# Patient Record
Sex: MALE | Race: ASIAN | NOT HISPANIC OR LATINO
[De-identification: names, ages, dates, MRNs, and addresses within clinical notes are randomized per-mention and may not be internally consistent; named-entity substitution may affect disease eponyms.]

---

## 2018-01-09 ENCOUNTER — APPOINTMENT (OUTPATIENT)
Dept: UROLOGY | Facility: CLINIC | Age: 68
End: 2018-01-09
Payer: MEDICARE

## 2018-01-09 PROCEDURE — 99214 OFFICE O/P EST MOD 30 MIN: CPT

## 2018-01-10 LAB
APPEARANCE: CLEAR
BACTERIA: NEGATIVE
BILIRUBIN URINE: NEGATIVE
BLOOD URINE: NEGATIVE
COLOR: YELLOW
GLUCOSE QUALITATIVE U: 100 MG/DL
HYALINE CASTS: 3 /LPF
KETONES URINE: ABNORMAL
LEUKOCYTE ESTERASE URINE: NEGATIVE
MICROSCOPIC-UA: NORMAL
NITRITE URINE: NEGATIVE
PH URINE: 5
PROTEIN URINE: NEGATIVE MG/DL
RED BLOOD CELLS URINE: 2 /HPF
SPECIFIC GRAVITY URINE: 1.03
SQUAMOUS EPITHELIAL CELLS: 0 /HPF
UROBILINOGEN URINE: NEGATIVE MG/DL
WHITE BLOOD CELLS URINE: 1 /HPF

## 2018-01-11 LAB — BACTERIA UR CULT: NORMAL

## 2018-02-14 ENCOUNTER — APPOINTMENT (OUTPATIENT)
Dept: GASTROENTEROLOGY | Facility: CLINIC | Age: 68
End: 2018-02-14
Payer: MEDICARE

## 2018-02-14 VITALS
RESPIRATION RATE: 16 BRPM | TEMPERATURE: 97.8 F | HEIGHT: 65 IN | BODY MASS INDEX: 24.66 KG/M2 | HEART RATE: 81 BPM | DIASTOLIC BLOOD PRESSURE: 72 MMHG | SYSTOLIC BLOOD PRESSURE: 124 MMHG | OXYGEN SATURATION: 98 % | WEIGHT: 148 LBS

## 2018-02-14 DIAGNOSIS — R10.30 LOWER ABDOMINAL PAIN, UNSPECIFIED: ICD-10-CM

## 2018-02-14 PROCEDURE — 99203 OFFICE O/P NEW LOW 30 MIN: CPT

## 2018-03-19 ENCOUNTER — RESULT REVIEW (OUTPATIENT)
Age: 68
End: 2018-03-19

## 2018-03-19 ENCOUNTER — OUTPATIENT (OUTPATIENT)
Dept: OUTPATIENT SERVICES | Facility: HOSPITAL | Age: 68
LOS: 1 days | Discharge: HOME | End: 2018-03-19
Payer: MEDICARE

## 2018-03-19 ENCOUNTER — APPOINTMENT (OUTPATIENT)
Dept: GASTROENTEROLOGY | Facility: HOSPITAL | Age: 68
End: 2018-03-19

## 2018-03-19 DIAGNOSIS — Z12.11 ENCOUNTER FOR SCREENING FOR MALIGNANT NEOPLASM OF COLON: ICD-10-CM

## 2018-03-19 LAB — GLUCOSE BLDC GLUCOMTR-MCNC: 108 MG/DL — HIGH (ref 70–99)

## 2018-03-19 PROCEDURE — 45380 COLONOSCOPY AND BIOPSY: CPT | Mod: GC

## 2018-03-19 PROCEDURE — 88305 TISSUE EXAM BY PATHOLOGIST: CPT | Mod: 26

## 2018-03-23 LAB — SURGICAL PATHOLOGY STUDY: SIGNIFICANT CHANGE UP

## 2018-04-20 ENCOUNTER — FORM ENCOUNTER (OUTPATIENT)
Age: 68
End: 2018-04-20

## 2018-04-21 ENCOUNTER — APPOINTMENT (OUTPATIENT)
Dept: MRI IMAGING | Facility: IMAGING CENTER | Age: 68
End: 2018-04-21
Payer: MEDICARE

## 2018-04-21 ENCOUNTER — OUTPATIENT (OUTPATIENT)
Dept: OUTPATIENT SERVICES | Facility: HOSPITAL | Age: 68
LOS: 1 days | End: 2018-04-21
Payer: COMMERCIAL

## 2018-04-21 DIAGNOSIS — Z00.8 ENCOUNTER FOR OTHER GENERAL EXAMINATION: ICD-10-CM

## 2018-04-21 PROCEDURE — 70551 MRI BRAIN STEM W/O DYE: CPT | Mod: 26

## 2018-04-21 PROCEDURE — 70551 MRI BRAIN STEM W/O DYE: CPT

## 2018-05-10 ENCOUNTER — APPOINTMENT (OUTPATIENT)
Dept: NEUROSURGERY | Facility: CLINIC | Age: 68
End: 2018-05-10
Payer: MEDICARE

## 2018-05-10 VITALS
BODY MASS INDEX: 24.49 KG/M2 | SYSTOLIC BLOOD PRESSURE: 142 MMHG | DIASTOLIC BLOOD PRESSURE: 83 MMHG | OXYGEN SATURATION: 98 % | WEIGHT: 147 LBS | HEART RATE: 65 BPM | HEIGHT: 65 IN

## 2018-05-10 DIAGNOSIS — R10.9 UNSPECIFIED ABDOMINAL PAIN: ICD-10-CM

## 2018-05-10 PROCEDURE — 99205 OFFICE O/P NEW HI 60 MIN: CPT | Mod: 57

## 2018-05-11 ENCOUNTER — APPOINTMENT (OUTPATIENT)
Dept: OPHTHALMOLOGY | Facility: CLINIC | Age: 68
End: 2018-05-11
Payer: MEDICARE

## 2018-05-11 PROCEDURE — 92083 EXTENDED VISUAL FIELD XM: CPT | Mod: TC

## 2018-05-31 ENCOUNTER — APPOINTMENT (OUTPATIENT)
Dept: NEUROLOGY | Facility: CLINIC | Age: 68
End: 2018-05-31
Payer: MEDICARE

## 2018-05-31 DIAGNOSIS — F41.9 ANXIETY DISORDER, UNSPECIFIED: ICD-10-CM

## 2018-05-31 DIAGNOSIS — F32.A ANXIETY DISORDER, UNSPECIFIED: ICD-10-CM

## 2018-05-31 PROCEDURE — 96118: CPT

## 2018-05-31 PROCEDURE — 96119: CPT | Mod: 59

## 2018-06-22 ENCOUNTER — APPOINTMENT (OUTPATIENT)
Dept: RADIOLOGY | Facility: HOSPITAL | Age: 68
End: 2018-06-22

## 2018-06-22 ENCOUNTER — OUTPATIENT (OUTPATIENT)
Dept: OUTPATIENT SERVICES | Facility: HOSPITAL | Age: 68
LOS: 1 days | End: 2018-06-22
Payer: COMMERCIAL

## 2018-06-22 DIAGNOSIS — G91.9 HYDROCEPHALUS, UNSPECIFIED: ICD-10-CM

## 2018-06-22 PROCEDURE — 62270 DX LMBR SPI PNXR: CPT

## 2018-06-22 PROCEDURE — 77003 FLUOROGUIDE FOR SPINE INJECT: CPT

## 2018-06-22 PROCEDURE — G8979: CPT | Mod: CH

## 2018-06-22 PROCEDURE — G8980: CPT | Mod: CH

## 2018-06-22 PROCEDURE — G8978: CPT | Mod: CH

## 2018-06-22 PROCEDURE — 77003 FLUOROGUIDE FOR SPINE INJECT: CPT | Mod: 26

## 2018-06-25 ENCOUNTER — APPOINTMENT (OUTPATIENT)
Dept: NEUROLOGY | Facility: CLINIC | Age: 68
End: 2018-06-25
Payer: MEDICARE

## 2018-06-25 PROCEDURE — 96118: CPT

## 2018-06-25 PROCEDURE — 96119: CPT | Mod: 59

## 2018-06-28 LAB
APPEARANCE CSF: CLEAR
COLOR CSF: NORMAL
COLOR SPUN CSF: COLORLESS
GLUCOSE CSF-MCNC: 87 MG/DL
LYMPHOCYTES NFR CSF MANUAL: 37 %
MONOS+MACROS NFR CSF MANUAL: 63 %
NEUTROPHILS NFR CSF MANUAL: 0 %
PROT CSF-MCNC: 81 MG/DL
RBC # CSF MANUAL: 21 /UL
TOTAL CELLS COUNTED CSF: 2 /UL
TUBE TYPE: NORMAL

## 2018-07-10 ENCOUNTER — APPOINTMENT (OUTPATIENT)
Dept: NEUROSURGERY | Facility: CLINIC | Age: 68
End: 2018-07-10
Payer: MEDICARE

## 2018-07-10 VITALS
SYSTOLIC BLOOD PRESSURE: 120 MMHG | DIASTOLIC BLOOD PRESSURE: 77 MMHG | WEIGHT: 147 LBS | HEIGHT: 65 IN | BODY MASS INDEX: 24.49 KG/M2 | HEART RATE: 70 BPM | OXYGEN SATURATION: 98 % | TEMPERATURE: 98.5 F

## 2018-07-10 PROCEDURE — 99215 OFFICE O/P EST HI 40 MIN: CPT | Mod: 57

## 2018-07-11 ENCOUNTER — OUTPATIENT (OUTPATIENT)
Dept: OUTPATIENT SERVICES | Facility: HOSPITAL | Age: 68
LOS: 1 days | End: 2018-07-11
Payer: COMMERCIAL

## 2018-07-11 VITALS
HEART RATE: 67 BPM | HEIGHT: 65 IN | SYSTOLIC BLOOD PRESSURE: 108 MMHG | OXYGEN SATURATION: 98 % | DIASTOLIC BLOOD PRESSURE: 73 MMHG | WEIGHT: 147.05 LBS | TEMPERATURE: 98 F | RESPIRATION RATE: 20 BRPM

## 2018-07-11 DIAGNOSIS — Z29.9 ENCOUNTER FOR PROPHYLACTIC MEASURES, UNSPECIFIED: ICD-10-CM

## 2018-07-11 DIAGNOSIS — Z98.890 OTHER SPECIFIED POSTPROCEDURAL STATES: Chronic | ICD-10-CM

## 2018-07-11 DIAGNOSIS — G91.9 HYDROCEPHALUS, UNSPECIFIED: ICD-10-CM

## 2018-07-11 DIAGNOSIS — G47.33 OBSTRUCTIVE SLEEP APNEA (ADULT) (PEDIATRIC): ICD-10-CM

## 2018-07-11 DIAGNOSIS — Z91.040 LATEX ALLERGY STATUS: ICD-10-CM

## 2018-07-11 DIAGNOSIS — E11.9 TYPE 2 DIABETES MELLITUS WITHOUT COMPLICATIONS: ICD-10-CM

## 2018-07-11 DIAGNOSIS — I10 ESSENTIAL (PRIMARY) HYPERTENSION: ICD-10-CM

## 2018-07-11 DIAGNOSIS — N40.0 BENIGN PROSTATIC HYPERPLASIA WITHOUT LOWER URINARY TRACT SYMPTOMS: ICD-10-CM

## 2018-07-11 DIAGNOSIS — Z01.818 ENCOUNTER FOR OTHER PREPROCEDURAL EXAMINATION: ICD-10-CM

## 2018-07-11 DIAGNOSIS — I83.90 ASYMPTOMATIC VARICOSE VEINS OF UNSPECIFIED LOWER EXTREMITY: Chronic | ICD-10-CM

## 2018-07-11 LAB
ANION GAP SERPL CALC-SCNC: 12 MMOL/L — SIGNIFICANT CHANGE UP (ref 5–17)
BUN SERPL-MCNC: 19 MG/DL — SIGNIFICANT CHANGE UP (ref 7–23)
CALCIUM SERPL-MCNC: 9.6 MG/DL — SIGNIFICANT CHANGE UP (ref 8.4–10.5)
CHLORIDE SERPL-SCNC: 101 MMOL/L — SIGNIFICANT CHANGE UP (ref 96–108)
CO2 SERPL-SCNC: 26 MMOL/L — SIGNIFICANT CHANGE UP (ref 22–31)
CREAT SERPL-MCNC: 1 MG/DL — SIGNIFICANT CHANGE UP (ref 0.5–1.3)
GLUCOSE SERPL-MCNC: 112 MG/DL — HIGH (ref 70–99)
HCT VFR BLD CALC: 40.6 % — SIGNIFICANT CHANGE UP (ref 39–50)
HGB BLD-MCNC: 13.8 G/DL — SIGNIFICANT CHANGE UP (ref 13–17)
MCHC RBC-ENTMCNC: 28.5 PG — SIGNIFICANT CHANGE UP (ref 27–34)
MCHC RBC-ENTMCNC: 34 GM/DL — SIGNIFICANT CHANGE UP (ref 32–36)
MCV RBC AUTO: 83.9 FL — SIGNIFICANT CHANGE UP (ref 80–100)
PLATELET # BLD AUTO: 216 K/UL — SIGNIFICANT CHANGE UP (ref 150–400)
POTASSIUM SERPL-MCNC: 4.7 MMOL/L — SIGNIFICANT CHANGE UP (ref 3.5–5.3)
POTASSIUM SERPL-SCNC: 4.7 MMOL/L — SIGNIFICANT CHANGE UP (ref 3.5–5.3)
RBC # BLD: 4.84 M/UL — SIGNIFICANT CHANGE UP (ref 4.2–5.8)
RBC # FLD: 13.2 % — SIGNIFICANT CHANGE UP (ref 10.3–14.5)
SODIUM SERPL-SCNC: 139 MMOL/L — SIGNIFICANT CHANGE UP (ref 135–145)
WBC # BLD: 5.16 K/UL — SIGNIFICANT CHANGE UP (ref 3.8–10.5)
WBC # FLD AUTO: 5.16 K/UL — SIGNIFICANT CHANGE UP (ref 3.8–10.5)

## 2018-07-11 PROCEDURE — G0463: CPT

## 2018-07-11 PROCEDURE — 87640 STAPH A DNA AMP PROBE: CPT

## 2018-07-11 PROCEDURE — 83036 HEMOGLOBIN GLYCOSYLATED A1C: CPT

## 2018-07-11 PROCEDURE — 80048 BASIC METABOLIC PNL TOTAL CA: CPT

## 2018-07-11 PROCEDURE — 87641 MR-STAPH DNA AMP PROBE: CPT

## 2018-07-11 PROCEDURE — 85027 COMPLETE CBC AUTOMATED: CPT

## 2018-07-11 RX ORDER — LIDOCAINE HCL 20 MG/ML
0.2 VIAL (ML) INJECTION ONCE
Qty: 0 | Refills: 0 | Status: DISCONTINUED | OUTPATIENT
Start: 2018-07-20 | End: 2018-07-21

## 2018-07-11 RX ORDER — CEFAZOLIN SODIUM 1 G
2000 VIAL (EA) INJECTION ONCE
Qty: 0 | Refills: 0 | Status: DISCONTINUED | OUTPATIENT
Start: 2018-07-20 | End: 2018-07-22

## 2018-07-11 NOTE — H&P PST ADULT - PMH
Abdominal pain  s/p colonoscopy -2018  BPH (benign prostatic hyperplasia)    DM type 2 (diabetes mellitus, type 2)    HTN (hypertension)    Hydrocephalus    Hypercholesterolemia    Organic writer's cramp    PARMINDER on CPAP  dx 7 years ago ,never used CPAP , scheduled for home sleep study on 7/12/2018  Ptosis of eyelid, bilateral

## 2018-07-11 NOTE — H&P PST ADULT - PROBLEM SELECTOR PLAN 5
continue current medication The Caprini score indicates this patient is at risk for a VTE event (score 3-5).  Most surgical patients in this group would benefit from pharmacologic prophylaxis.  The surgical team will determine the balance between VTE risk and bleeding risk

## 2018-07-11 NOTE — H&P PST ADULT - HISTORY OF PRESENT ILLNESS
69 y/o male with  PMH of HTN, HLD, DM Type 2 (unknown Hga1c), BPH . Pt has a history for gait abnormality and imbalance followed by psych and neuro s/p spinal tap in 6/22/2018 with significant improvement with gait and balance.  Presents to PST for scheduled Laparoscopic  Shunt Insertion on 7/20/2018. 69 y/o male with  PMH of HTN, HLD, DM Type 2 (unknown HgA1c), BPH . Pt has a history for gait abnormality and imbalance followed by psych and neuro s/p spinal tap in 6/22/2018 with significant improvement with gait and balance.  Presents to PST for scheduled Laparoscopic  Shunt Insertion on 7/20/2018.

## 2018-07-11 NOTE — H&P PST ADULT - ASSESSMENT
CAPRINI SCORE [CLOT]    AGE RELATED RISK FACTORS                                                       MOBILITY RELATED FACTORS  [ ] Age 41-60 years                                            (1 Point)                  [ ] Bed rest                                                        (1 Point)  [ x] Age: 61-74 years                                           (2 Points)                 [ ] Plaster cast                                                   (2 Points)  [ ] Age= 75 years                                              (3 Points)                 [ ] Bed bound for more than 72 hours                 (2 Points)    DISEASE RELATED RISK FACTORS                                               GENDER SPECIFIC FACTORS  [ ] Edema in the lower extremities                       (1 Point)                  [ ] Pregnancy                                                     (1 Point)  [ x] Varicose veins                                               (1 Point)                  [ ] Post-partum < 6 weeks                                   (1 Point)             [ ] BMI > 25 Kg/m2                                            (1 Point)                  [ ] Hormonal therapy  or oral contraception          (1 Point)                 [ ] Sepsis (in the previous month)                        (1 Point)                  [ ] History of pregnancy complications                 (1 point)  [ ] Pneumonia or serious lung disease                                               [ ] Unexplained or recurrent                     (1 Point)           (in the previous month)                               (1 Point)  [ ] Abnormal pulmonary function test                     (1 Point)                 SURGERY RELATED RISK FACTORS  [ ] Acute myocardial infarction                              (1 Point)                 [ ]  Section                                             (1 Point)  [ ] Congestive heart failure (in the previous month)  (1 Point)               [ ] Minor surgery                                                  (1 Point)   [ ] Inflammatory bowel disease                             (1 Point)                 [ ] Arthroscopic surgery                                        (2 Points)  [ ] Central venous access                                      (2 Points)                [ x] General surgery lasting more than 45 minutes   (2 Points)       [ ] Stroke (in the previous month)                          (5 Points)               [ ] Elective arthroplasty                                         (5 Points)                                                                                                                                               HEMATOLOGY RELATED FACTORS                                                 TRAUMA RELATED RISK FACTORS  [ ] Prior episodes of VTE                                     (3 Points)                 [ ] Fracture of the hip, pelvis, or leg                       (5 Points)  [ ] Positive family history for VTE                         (3 Points)                 [ ] Acute spinal cord injury (in the previous month)  (5 Points)  [ ] Prothrombin 68101 A                                     (3 Points)                 [ ] Paralysis  (less than 1 month)                             (5 Points)  [ ] Factor V Leiden                                             (3 Points)                  [ ] Multiple Trauma within 1 month                        (5 Points)  [ ] Lupus anticoagulants                                     (3 Points)                                                           [ ] Anticardiolipin antibodies                               (3 Points)                                                       [ ] High homocysteine in the blood                      (3 Points)                                             [ ] Other congenital or acquired thrombophilia      (3 Points)                                                [ ] Heparin induced thrombocytopenia                  (3 Points)                                          Total Score [     5     ]

## 2018-07-11 NOTE — H&P PST ADULT - PROBLEM SELECTOR PLAN 6
The Caprini score indicates this patient is at risk for a VTE event (score 3-5).  Most surgical patients in this group would benefit from pharmacologic prophylaxis.  The surgical team will determine the balance between VTE risk and bleeding risk OR notified

## 2018-07-12 LAB
HBA1C BLD-MCNC: 7 % — HIGH (ref 4–5.6)
MRSA PCR RESULT.: SIGNIFICANT CHANGE UP
S AUREUS DNA NOSE QL NAA+PROBE: SIGNIFICANT CHANGE UP

## 2018-07-18 ENCOUNTER — APPOINTMENT (OUTPATIENT)
Dept: NEUROLOGY | Facility: CLINIC | Age: 68
End: 2018-07-18

## 2018-07-19 ENCOUNTER — TRANSCRIPTION ENCOUNTER (OUTPATIENT)
Age: 68
End: 2018-07-19

## 2018-07-20 ENCOUNTER — INPATIENT (INPATIENT)
Facility: HOSPITAL | Age: 68
LOS: 3 days | Discharge: HOME | DRG: 32 | End: 2018-07-24
Attending: NEUROLOGICAL SURGERY | Admitting: NEUROLOGICAL SURGERY
Payer: COMMERCIAL

## 2018-07-20 ENCOUNTER — APPOINTMENT (OUTPATIENT)
Dept: NEUROSURGERY | Facility: CLINIC | Age: 68
End: 2018-07-20

## 2018-07-20 VITALS
RESPIRATION RATE: 20 BRPM | TEMPERATURE: 98 F | HEIGHT: 65 IN | DIASTOLIC BLOOD PRESSURE: 76 MMHG | HEART RATE: 68 BPM | SYSTOLIC BLOOD PRESSURE: 112 MMHG | WEIGHT: 147.05 LBS | OXYGEN SATURATION: 98 %

## 2018-07-20 DIAGNOSIS — Z98.890 OTHER SPECIFIED POSTPROCEDURAL STATES: Chronic | ICD-10-CM

## 2018-07-20 DIAGNOSIS — G91.9 HYDROCEPHALUS, UNSPECIFIED: ICD-10-CM

## 2018-07-20 DIAGNOSIS — I83.90 ASYMPTOMATIC VARICOSE VEINS OF UNSPECIFIED LOWER EXTREMITY: Chronic | ICD-10-CM

## 2018-07-20 LAB
GLUCOSE BLDC GLUCOMTR-MCNC: 115 MG/DL — HIGH (ref 70–99)
GLUCOSE BLDC GLUCOMTR-MCNC: 211 MG/DL — HIGH (ref 70–99)

## 2018-07-20 PROCEDURE — 70450 CT HEAD/BRAIN W/O DYE: CPT | Mod: 26

## 2018-07-20 PROCEDURE — 62223 ESTABLISH BRAIN CAVITY SHUNT: CPT | Mod: 62

## 2018-07-20 RX ORDER — INSULIN LISPRO 100/ML
VIAL (ML) SUBCUTANEOUS AT BEDTIME
Qty: 0 | Refills: 0 | Status: DISCONTINUED | OUTPATIENT
Start: 2018-07-20 | End: 2018-07-24

## 2018-07-20 RX ORDER — ONDANSETRON 8 MG/1
4 TABLET, FILM COATED ORAL ONCE
Qty: 0 | Refills: 0 | Status: DISCONTINUED | OUTPATIENT
Start: 2018-07-20 | End: 2018-07-20

## 2018-07-20 RX ORDER — PANTOPRAZOLE SODIUM 20 MG/1
40 TABLET, DELAYED RELEASE ORAL DAILY
Qty: 0 | Refills: 0 | Status: DISCONTINUED | OUTPATIENT
Start: 2018-07-20 | End: 2018-07-21

## 2018-07-20 RX ORDER — DEXTROSE 50 % IN WATER 50 %
25 SYRINGE (ML) INTRAVENOUS ONCE
Qty: 0 | Refills: 0 | Status: DISCONTINUED | OUTPATIENT
Start: 2018-07-20 | End: 2018-07-24

## 2018-07-20 RX ORDER — GLUCAGON INJECTION, SOLUTION 0.5 MG/.1ML
1 INJECTION, SOLUTION SUBCUTANEOUS ONCE
Qty: 0 | Refills: 0 | Status: DISCONTINUED | OUTPATIENT
Start: 2018-07-20 | End: 2018-07-24

## 2018-07-20 RX ORDER — ACETAMINOPHEN 500 MG
650 TABLET ORAL EVERY 6 HOURS
Qty: 0 | Refills: 0 | Status: DISCONTINUED | OUTPATIENT
Start: 2018-07-20 | End: 2018-07-24

## 2018-07-20 RX ORDER — DOCUSATE SODIUM 100 MG
100 CAPSULE ORAL THREE TIMES A DAY
Qty: 0 | Refills: 0 | Status: DISCONTINUED | OUTPATIENT
Start: 2018-07-20 | End: 2018-07-24

## 2018-07-20 RX ORDER — ONDANSETRON 8 MG/1
4 TABLET, FILM COATED ORAL EVERY 6 HOURS
Qty: 0 | Refills: 0 | Status: DISCONTINUED | OUTPATIENT
Start: 2018-07-20 | End: 2018-07-24

## 2018-07-20 RX ORDER — DEXTROSE MONOHYDRATE, SODIUM CHLORIDE, AND POTASSIUM CHLORIDE 50; .745; 4.5 G/1000ML; G/1000ML; G/1000ML
1000 INJECTION, SOLUTION INTRAVENOUS
Qty: 0 | Refills: 0 | Status: DISCONTINUED | OUTPATIENT
Start: 2018-07-20 | End: 2018-07-22

## 2018-07-20 RX ORDER — HYDROMORPHONE HYDROCHLORIDE 2 MG/ML
0.5 INJECTION INTRAMUSCULAR; INTRAVENOUS; SUBCUTANEOUS
Qty: 0 | Refills: 0 | Status: DISCONTINUED | OUTPATIENT
Start: 2018-07-20 | End: 2018-07-20

## 2018-07-20 RX ORDER — DEXTROSE 50 % IN WATER 50 %
15 SYRINGE (ML) INTRAVENOUS ONCE
Qty: 0 | Refills: 0 | Status: DISCONTINUED | OUTPATIENT
Start: 2018-07-20 | End: 2018-07-24

## 2018-07-20 RX ORDER — MORPHINE SULFATE 50 MG/1
1 CAPSULE, EXTENDED RELEASE ORAL EVERY 4 HOURS
Qty: 0 | Refills: 0 | Status: DISCONTINUED | OUTPATIENT
Start: 2018-07-20 | End: 2018-07-21

## 2018-07-20 RX ORDER — SODIUM CHLORIDE 9 MG/ML
1000 INJECTION, SOLUTION INTRAVENOUS
Qty: 0 | Refills: 0 | Status: DISCONTINUED | OUTPATIENT
Start: 2018-07-20 | End: 2018-07-24

## 2018-07-20 RX ORDER — SODIUM CHLORIDE 9 MG/ML
3 INJECTION INTRAMUSCULAR; INTRAVENOUS; SUBCUTANEOUS EVERY 8 HOURS
Qty: 0 | Refills: 0 | Status: DISCONTINUED | OUTPATIENT
Start: 2018-07-20 | End: 2018-07-20

## 2018-07-20 RX ORDER — DEXTROSE 50 % IN WATER 50 %
12.5 SYRINGE (ML) INTRAVENOUS ONCE
Qty: 0 | Refills: 0 | Status: DISCONTINUED | OUTPATIENT
Start: 2018-07-20 | End: 2018-07-24

## 2018-07-20 RX ORDER — HYDROMORPHONE HYDROCHLORIDE 2 MG/ML
0.25 INJECTION INTRAMUSCULAR; INTRAVENOUS; SUBCUTANEOUS
Qty: 0 | Refills: 0 | Status: DISCONTINUED | OUTPATIENT
Start: 2018-07-20 | End: 2018-07-20

## 2018-07-20 RX ORDER — LEVETIRACETAM 250 MG/1
500 TABLET, FILM COATED ORAL EVERY 12 HOURS
Qty: 0 | Refills: 0 | Status: DISCONTINUED | OUTPATIENT
Start: 2018-07-20 | End: 2018-07-21

## 2018-07-20 RX ORDER — INSULIN LISPRO 100/ML
VIAL (ML) SUBCUTANEOUS
Qty: 0 | Refills: 0 | Status: DISCONTINUED | OUTPATIENT
Start: 2018-07-20 | End: 2018-07-24

## 2018-07-20 RX ADMIN — Medication 100 MILLIGRAM(S): at 21:42

## 2018-07-20 RX ADMIN — HYDROMORPHONE HYDROCHLORIDE 0.5 MILLIGRAM(S): 2 INJECTION INTRAMUSCULAR; INTRAVENOUS; SUBCUTANEOUS at 13:30

## 2018-07-20 RX ADMIN — SODIUM CHLORIDE 3 MILLILITER(S): 9 INJECTION INTRAMUSCULAR; INTRAVENOUS; SUBCUTANEOUS at 08:21

## 2018-07-20 RX ADMIN — LEVETIRACETAM 500 MILLIGRAM(S): 250 TABLET, FILM COATED ORAL at 20:22

## 2018-07-20 RX ADMIN — HYDROMORPHONE HYDROCHLORIDE 0.5 MILLIGRAM(S): 2 INJECTION INTRAMUSCULAR; INTRAVENOUS; SUBCUTANEOUS at 13:15

## 2018-07-21 LAB
ANION GAP SERPL CALC-SCNC: 10 MMOL/L — SIGNIFICANT CHANGE UP (ref 5–17)
BUN SERPL-MCNC: 16 MG/DL — SIGNIFICANT CHANGE UP (ref 7–23)
CALCIUM SERPL-MCNC: 8.7 MG/DL — SIGNIFICANT CHANGE UP (ref 8.4–10.5)
CHLORIDE SERPL-SCNC: 105 MMOL/L — SIGNIFICANT CHANGE UP (ref 96–108)
CO2 SERPL-SCNC: 24 MMOL/L — SIGNIFICANT CHANGE UP (ref 22–31)
CREAT SERPL-MCNC: 0.84 MG/DL — SIGNIFICANT CHANGE UP (ref 0.5–1.3)
GLUCOSE BLDC GLUCOMTR-MCNC: 172 MG/DL — HIGH (ref 70–99)
GLUCOSE BLDC GLUCOMTR-MCNC: 179 MG/DL — HIGH (ref 70–99)
GLUCOSE BLDC GLUCOMTR-MCNC: 184 MG/DL — HIGH (ref 70–99)
GLUCOSE BLDC GLUCOMTR-MCNC: 186 MG/DL — HIGH (ref 70–99)
GLUCOSE SERPL-MCNC: 190 MG/DL — HIGH (ref 70–99)
HCT VFR BLD CALC: 41.2 % — SIGNIFICANT CHANGE UP (ref 39–50)
HGB BLD-MCNC: 13.6 G/DL — SIGNIFICANT CHANGE UP (ref 13–17)
MCHC RBC-ENTMCNC: 28.5 PG — SIGNIFICANT CHANGE UP (ref 27–34)
MCHC RBC-ENTMCNC: 33.1 GM/DL — SIGNIFICANT CHANGE UP (ref 32–36)
MCV RBC AUTO: 85.9 FL — SIGNIFICANT CHANGE UP (ref 80–100)
PLATELET # BLD AUTO: 184 K/UL — SIGNIFICANT CHANGE UP (ref 150–400)
POTASSIUM SERPL-MCNC: 4.4 MMOL/L — SIGNIFICANT CHANGE UP (ref 3.5–5.3)
POTASSIUM SERPL-SCNC: 4.4 MMOL/L — SIGNIFICANT CHANGE UP (ref 3.5–5.3)
RBC # BLD: 4.79 M/UL — SIGNIFICANT CHANGE UP (ref 4.2–5.8)
RBC # FLD: 11.7 % — SIGNIFICANT CHANGE UP (ref 10.3–14.5)
SODIUM SERPL-SCNC: 139 MMOL/L — SIGNIFICANT CHANGE UP (ref 135–145)
WBC # BLD: 8.9 K/UL — SIGNIFICANT CHANGE UP (ref 3.8–10.5)
WBC # FLD AUTO: 8.9 K/UL — SIGNIFICANT CHANGE UP (ref 3.8–10.5)

## 2018-07-21 RX ORDER — ATORVASTATIN CALCIUM 80 MG/1
20 TABLET, FILM COATED ORAL AT BEDTIME
Qty: 0 | Refills: 0 | Status: DISCONTINUED | OUTPATIENT
Start: 2018-07-21 | End: 2018-07-24

## 2018-07-21 RX ORDER — PANTOPRAZOLE SODIUM 20 MG/1
40 TABLET, DELAYED RELEASE ORAL DAILY
Qty: 0 | Refills: 0 | Status: DISCONTINUED | OUTPATIENT
Start: 2018-07-21 | End: 2018-07-24

## 2018-07-21 RX ORDER — ACETAMINOPHEN 500 MG
1000 TABLET ORAL ONCE
Qty: 0 | Refills: 0 | Status: COMPLETED | OUTPATIENT
Start: 2018-07-21 | End: 2018-07-21

## 2018-07-21 RX ORDER — TAMSULOSIN HYDROCHLORIDE 0.4 MG/1
0.4 CAPSULE ORAL AT BEDTIME
Qty: 0 | Refills: 0 | Status: DISCONTINUED | OUTPATIENT
Start: 2018-07-21 | End: 2018-07-24

## 2018-07-21 RX ORDER — PROPRANOLOL HCL 160 MG
10 CAPSULE, EXTENDED RELEASE 24HR ORAL
Qty: 0 | Refills: 0 | Status: DISCONTINUED | OUTPATIENT
Start: 2018-07-21 | End: 2018-07-24

## 2018-07-21 RX ORDER — TRAMADOL HYDROCHLORIDE 50 MG/1
50 TABLET ORAL EVERY 6 HOURS
Qty: 0 | Refills: 0 | Status: DISCONTINUED | OUTPATIENT
Start: 2018-07-21 | End: 2018-07-23

## 2018-07-21 RX ADMIN — TRAMADOL HYDROCHLORIDE 50 MILLIGRAM(S): 50 TABLET ORAL at 13:16

## 2018-07-21 RX ADMIN — LEVETIRACETAM 500 MILLIGRAM(S): 250 TABLET, FILM COATED ORAL at 05:23

## 2018-07-21 RX ADMIN — TRAMADOL HYDROCHLORIDE 50 MILLIGRAM(S): 50 TABLET ORAL at 14:15

## 2018-07-21 RX ADMIN — ATORVASTATIN CALCIUM 20 MILLIGRAM(S): 80 TABLET, FILM COATED ORAL at 21:05

## 2018-07-21 RX ADMIN — Medication 650 MILLIGRAM(S): at 10:09

## 2018-07-21 RX ADMIN — Medication 400 MILLIGRAM(S): at 05:21

## 2018-07-21 RX ADMIN — PANTOPRAZOLE SODIUM 40 MILLIGRAM(S): 20 TABLET, DELAYED RELEASE ORAL at 11:50

## 2018-07-21 RX ADMIN — Medication 100 MILLIGRAM(S): at 13:17

## 2018-07-21 RX ADMIN — Medication 100 MILLIGRAM(S): at 05:23

## 2018-07-21 RX ADMIN — Medication 100 MILLIGRAM(S): at 21:05

## 2018-07-21 RX ADMIN — Medication 1000 MILLIGRAM(S): at 05:50

## 2018-07-21 RX ADMIN — Medication 1: at 18:08

## 2018-07-21 RX ADMIN — Medication 1: at 13:16

## 2018-07-21 RX ADMIN — TRAMADOL HYDROCHLORIDE 50 MILLIGRAM(S): 50 TABLET ORAL at 19:35

## 2018-07-21 RX ADMIN — TAMSULOSIN HYDROCHLORIDE 0.4 MILLIGRAM(S): 0.4 CAPSULE ORAL at 21:06

## 2018-07-21 RX ADMIN — Medication 1: at 08:40

## 2018-07-21 RX ADMIN — Medication 10 MILLIGRAM(S): at 18:08

## 2018-07-21 RX ADMIN — Medication 10 MILLIGRAM(S): at 05:23

## 2018-07-21 RX ADMIN — TRAMADOL HYDROCHLORIDE 50 MILLIGRAM(S): 50 TABLET ORAL at 20:05

## 2018-07-22 ENCOUNTER — TRANSCRIPTION ENCOUNTER (OUTPATIENT)
Age: 68
End: 2018-07-22

## 2018-07-22 LAB
GLUCOSE BLDC GLUCOMTR-MCNC: 134 MG/DL — HIGH (ref 70–99)
GLUCOSE BLDC GLUCOMTR-MCNC: 135 MG/DL — HIGH (ref 70–99)
GLUCOSE BLDC GLUCOMTR-MCNC: 140 MG/DL — HIGH (ref 70–99)
GLUCOSE BLDC GLUCOMTR-MCNC: 199 MG/DL — HIGH (ref 70–99)

## 2018-07-22 PROCEDURE — 74019 RADEX ABDOMEN 2 VIEWS: CPT | Mod: 26

## 2018-07-22 PROCEDURE — 70450 CT HEAD/BRAIN W/O DYE: CPT | Mod: 26

## 2018-07-22 RX ORDER — ENOXAPARIN SODIUM 100 MG/ML
40 INJECTION SUBCUTANEOUS AT BEDTIME
Qty: 0 | Refills: 0 | Status: DISCONTINUED | OUTPATIENT
Start: 2018-07-22 | End: 2018-07-24

## 2018-07-22 RX ORDER — ACETAMINOPHEN 500 MG
2 TABLET ORAL
Qty: 0 | Refills: 0 | COMMUNITY
Start: 2018-07-22

## 2018-07-22 RX ORDER — TRAMADOL HYDROCHLORIDE 50 MG/1
1 TABLET ORAL
Qty: 25 | Refills: 0 | OUTPATIENT
Start: 2018-07-22

## 2018-07-22 RX ORDER — DOCUSATE SODIUM 100 MG
1 CAPSULE ORAL
Qty: 0 | Refills: 0 | COMMUNITY
Start: 2018-07-22

## 2018-07-22 RX ORDER — SODIUM CHLORIDE 9 MG/ML
500 INJECTION INTRAMUSCULAR; INTRAVENOUS; SUBCUTANEOUS ONCE
Qty: 0 | Refills: 0 | Status: COMPLETED | OUTPATIENT
Start: 2018-07-22 | End: 2018-07-22

## 2018-07-22 RX ADMIN — Medication 10 MILLIGRAM(S): at 05:53

## 2018-07-22 RX ADMIN — ATORVASTATIN CALCIUM 20 MILLIGRAM(S): 80 TABLET, FILM COATED ORAL at 21:25

## 2018-07-22 RX ADMIN — Medication 10 MILLIGRAM(S): at 23:15

## 2018-07-22 RX ADMIN — Medication 100 MILLIGRAM(S): at 05:53

## 2018-07-22 RX ADMIN — TAMSULOSIN HYDROCHLORIDE 0.4 MILLIGRAM(S): 0.4 CAPSULE ORAL at 21:25

## 2018-07-22 RX ADMIN — Medication 10 MILLIGRAM(S): at 18:03

## 2018-07-22 RX ADMIN — Medication 1: at 13:04

## 2018-07-22 RX ADMIN — PANTOPRAZOLE SODIUM 40 MILLIGRAM(S): 20 TABLET, DELAYED RELEASE ORAL at 11:38

## 2018-07-22 RX ADMIN — TRAMADOL HYDROCHLORIDE 50 MILLIGRAM(S): 50 TABLET ORAL at 05:53

## 2018-07-22 RX ADMIN — Medication 100 MILLIGRAM(S): at 14:14

## 2018-07-22 RX ADMIN — SODIUM CHLORIDE 1000 MILLILITER(S): 9 INJECTION INTRAMUSCULAR; INTRAVENOUS; SUBCUTANEOUS at 19:35

## 2018-07-22 RX ADMIN — Medication 100 MILLIGRAM(S): at 21:25

## 2018-07-22 RX ADMIN — ENOXAPARIN SODIUM 40 MILLIGRAM(S): 100 INJECTION SUBCUTANEOUS at 21:25

## 2018-07-22 NOTE — DISCHARGE NOTE ADULT - MEDICATION SUMMARY - MEDICATIONS TO TAKE
I will START or STAY ON the medications listed below when I get home from the hospital:    acetaminophen 325 mg oral tablet  -- 2 tab(s) by mouth every 6 hours, As needed, For mild pain or Temp greater than 38 C (100.4 F)  -- Indication: For Pain/temperature    traMADol 50 mg oral tablet  -- 1 tab(s) by mouth every 6 hours, As needed, Severe Pain (7 - 10) MDD:4  -- Indication: For severe pain    Flomax 0.4 mg oral capsule  -- 1 cap(s) by mouth once a day (at bedtime)  -- Indication: For BPH (benign prostatic hyperplasia)    propranolol 10 mg oral tablet  -- 1 tab(s) by mouth 2 times a day  -- Indication: For Blood pressure    metFORMIN 1000 mg oral tablet, extended release  -- 1 tab(s) by mouth once a day  -- Indication: For DM type 2 (diabetes mellitus, type 2)    Lipitor 20 mg oral tablet  -- 1 tab(s) by mouth once a day (at bedtime)  -- Indication: For cholesterol    docusate sodium 100 mg oral capsule  -- 1 cap(s) by mouth 3 times a day  -- Indication: For constipation I will START or STAY ON the medications listed below when I get home from the hospital:    acetaminophen 325 mg oral tablet  -- 2 tab(s) by mouth every 6 hours, As needed, For mild pain   -- Indication: For Headache    Flomax 0.4 mg oral capsule  -- 1 cap(s) by mouth once a day (at bedtime)  -- Indication: For BPH (benign prostatic hyperplasia)    propranolol 10 mg oral tablet  -- 1 tab(s) by mouth 2 times a day  -- Indication: For essential tremors     metFORMIN 1000 mg oral tablet, extended release  -- 1 tab(s) by mouth once a day  -- Indication: For DM type 2 (diabetes mellitus, type 2)    Lipitor 20 mg oral tablet  -- 1 tab(s) by mouth once a day (at bedtime)  -- Indication: For cholesterol    polyethylene glycol 3350 oral powder for reconstitution  -- 17 gram(s) by mouth once a day  -- Indication: For constipation

## 2018-07-22 NOTE — DISCHARGE NOTE ADULT - REASON FOR ADMISSION
7/20/18 s/p laparoscopic ventriculo-peritoneal shunt (VPS) placement for normal pressure hydrocephalus (NPH). Certas valve @ 6

## 2018-07-22 NOTE — PHYSICAL THERAPY INITIAL EVALUATION ADULT - PERTINENT HX OF CURRENT PROBLEM, REHAB EVAL
69 y/o male with  PMH of HTN, HLD, DM Type 2 (unknown HgA1c), BPH . Pt has a history for gait abnormality and imbalance followed by psych and neuro s/p spinal tap in 6/22/2018 with significant improvement with gait and balance.  Presents to PST for scheduled Laparoscopic  Shunt Insertion on 7/20/2018.

## 2018-07-22 NOTE — DISCHARGE NOTE ADULT - ADDITIONAL INSTRUCTIONS
You have a programmable shunt and if you have an MRI you should have your shunt reset within 24 hours, please keep a record of your settings. You have a programmable shunt and if you have an MRI you should have your shunt reset within 24 hours, please keep a record of your settings.  Please let steri strips fall off. You have a programmable shunt and if you have an MRI you should have your shunt reset within 24 hours, please keep a record of your settings.  Please let steri strips fall off.  Do not take Aspirin  Motrin, aleve , Naproxen for pain.

## 2018-07-22 NOTE — DISCHARGE NOTE ADULT - CARE PROVIDER_API CALL
Peter Weldon (MD), Neurological Surgery  [address] [place] [address]  Phone: [phone] (Clinical)  Fax: [phone]

## 2018-07-22 NOTE — DISCHARGE NOTE ADULT - CARE PLAN
Principal Discharge DX:	Hydrocephalus  Goal:	7/20/18 s/p laparoscopic ventriculo-peritoneal shunt (VPS) placement for normal pressure hydrocephalus (NPH). Certas valve @ 6  Assessment and plan of treatment:	Dr Weldon- neurosurgeon- please call office for appointment next week, would check and staple removal at that time.   Primary Care physician within 1-2 weeks of discharge.   please notify physician if fevers, bleeding, swelling, pain not relieved by medication, increased sluggishness or irritability, increased nausea or vomiting, inability to tolerate foods or liquids.   please do not engage in strenuous activity, heavy lifting, drive, or return to work or school until cleared by surgeon.   please keep incision clean and dry, do not submerge wound in water for prolonged periods of time, pat dry after showering, and do not use any creams or ointments to incision.  Secondary Diagnosis:	HTN (hypertension)  Assessment and plan of treatment:	Please continue medications and follow up with your primary care physician within 1-2 weeks of discharge.  Secondary Diagnosis:	BPH (benign prostatic hyperplasia)  Assessment and plan of treatment:	Please continue medications and follow up with your primary care physician within 1-2 weeks of discharge.  Secondary Diagnosis:	DM type 2 (diabetes mellitus, type 2)  Assessment and plan of treatment:	Please continue medications and follow up with your primary care physician and/or endocrinologist within 1-2 weeks of discharge.  Secondary Diagnosis:	Hypercholesterolemia  Assessment and plan of treatment:	Please continue medications and follow up with your primary care physician within 1-2 weeks of discharge. Principal Discharge DX:	Hydrocephalus  Goal:	7/20/18 s/p laparoscopic ventriculo-peritoneal shunt (VPS) placement for normal pressure hydrocephalus (NPH). Certas valve @ 6  Assessment and plan of treatment:	Dr Weldon- neurosurgeon- please call office for appointment next week, would check and staple removal at that time.   Primary Care physician within 1-2 weeks of discharge.   please notify physician if fevers, bleeding, swelling, pain not relieved by medication, increased sluggishness or irritability, increased nausea or vomiting, inability to tolerate foods or liquids.   please do not engage in strenuous activity, heavy lifting, drive, or return to work or school until cleared by surgeon.   please keep incision clean and dry, do not submerge wound in water for prolonged periods of time, pat dry after showering, and do not use any creams or ointments to incision.  Secondary Diagnosis:	BPH (benign prostatic hyperplasia)  Assessment and plan of treatment:	Continue Flomax  Secondary Diagnosis:	DM type 2 (diabetes mellitus, type 2)  Assessment and plan of treatment:	Please continue medications and follow up with your primary care physician within 1-2 weeks of discharge.  Secondary Diagnosis:	Hypercholesterolemia  Assessment and plan of treatment:	Continue Lipitor  Secondary Diagnosis:	Orthostatic hypotension  Assessment and plan of treatment:	To check BP at home. If orthostatic to consider stopping Flomax  Follow up with primary care in 1 week

## 2018-07-22 NOTE — DISCHARGE NOTE ADULT - PATIENT PORTAL LINK FT
You can access the [facility] Patient Portal, offered by [facility], by registering with the following website: [email or web address]

## 2018-07-22 NOTE — PHYSICAL THERAPY INITIAL EVALUATION ADULT - DISCHARGE DISPOSITION, PT EVAL
Home with home PT for gait, balance, & strength training and to return pt to baseline functional mobility status. Rolling walker with ambulation (pt does not own). Supervision/assist with mobility skills at this time. **Pt cleared for d/c home from PT perspective at this time./home w/ home PT

## 2018-07-22 NOTE — PHYSICAL THERAPY INITIAL EVALUATION ADULT - RISK REDUCTION/PREVENTION, PT EVAL
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 US abd showed hepatomegaly with innumerable complex predominantly hepatic lesions worriosome for metastatic disease with no biliary ductal dilation. Hepatology recommend liver biopsy.     4/4 CT head no acute intracranial abnormality. Worsen hypoxia and fever then transferred to ICU on 4 L NC on 4/4. 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 Hgb 9.6>4.4-received 2 units PRBC today -concern for hemolysis. Patient received Rasburicase on 4/2 concern for TLS. UA 11.9>2.2.     Nephrology consult for CARROLL superimposed on CKD stage 3, AGMA, and hyperkalemia. Baseline Cr 1.2-1.3. On admit, Cr 2.0 that improved slight 1.3>1.7 then bump Cr 1.7 yesterday and today Cr 2.2 on day of consult. On admit, K 5.1 then bump next day K 7.3 and remains elevated despite shift d50/insulin/kaexyalate. This am, K 5.9 6m. Acidosis also improved after bicarb 150 meq given this am.     3/14/18 decreased corticmedullary distinction with areas of cortical thinning and left renal simple cyst.     Since admission, multiple episodes of hypotension daily.     Tacrolimus level 3.2 on 4/3.      No hx of NSAIDs or antibiotics PTA.        risk factors

## 2018-07-22 NOTE — CHART NOTE - NSCHARTNOTEFT_GEN_A_CORE
called by nurse that he vomited, now sitting in chair, returned to bed and zofran given- will old d/c for now.

## 2018-07-22 NOTE — PHYSICAL THERAPY INITIAL EVALUATION ADULT - PLANNED THERAPY INTERVENTIONS, PT EVAL
balance training/gait training/bed mobility training/transfer training/GOAL: Pt will negotiate 3 stairs with 1 HR and step to pattern with independence in 2 weeks.

## 2018-07-22 NOTE — DISCHARGE NOTE ADULT - PLAN OF CARE
7/20/18 s/p laparoscopic ventriculo-peritoneal shunt (VPS) placement for normal pressure hydrocephalus (NPH). Certas valve @ 6 Dr Weldon- neurosurgeon- please call office for appointment next week, would check and staple removal at that time.   Primary Care physician within 1-2 weeks of discharge.   please notify physician if fevers, bleeding, swelling, pain not relieved by medication, increased sluggishness or irritability, increased nausea or vomiting, inability to tolerate foods or liquids.   please do not engage in strenuous activity, heavy lifting, drive, or return to work or school until cleared by surgeon.   please keep incision clean and dry, do not submerge wound in water for prolonged periods of time, pat dry after showering, and do not use any creams or ointments to incision. Please continue medications and follow up with your primary care physician within 1-2 weeks of discharge. Please continue medications and follow up with your primary care physician and/or endocrinologist within 1-2 weeks of discharge. Continue Flomax Continue Lipitor To check BP at home. If orthostatic to consider stopping Flomax  Follow up with primary care in 1 week

## 2018-07-22 NOTE — DISCHARGE NOTE ADULT - SECONDARY DIAGNOSIS.
HTN (hypertension) BPH (benign prostatic hyperplasia) DM type 2 (diabetes mellitus, type 2) Hypercholesterolemia Orthostatic hypotension

## 2018-07-22 NOTE — DISCHARGE NOTE ADULT - HOSPITAL COURSE
69 y/o male with  PMH of HTN, HLD, DM Type 2 (unknown HgA1c), BPH . Pt has a history for gait abnormality and imbalance followed by psych and neuro s/p spinal tap in 6/22/2018 with significant improvement with gait and balance.  Presented to PST for scheduled Laparoscopic  Shunt Insertion. (11 Jul 2018 14:00)  Patient admitted on 7/20/18 and underwent a laparoscopic ventriculo-peritoneal shunt (VPS) placement for normal pressure hydrocephalus (NPH). Certas valve @ 6. Post operatively he did well. He underwent routine post operative management without complications. Post op CT head showed catheter in good position with unchanged ventricle size. PT evaluated him and recommended home PT with a rolling walker. On 7/22/18 he was medically and neurolgically stable for discharge to home. 69 y/o male with  PMH of HTN, HLD, DM Type 2 (unknown HgA1c), BPH . Pt has a history for gait abnormality and imbalance followed by psych and neuro s/p spinal tap in 6/22/2018 with significant improvement with gait and balance.  Presented to PST for scheduled Laparoscopic  Shunt Insertion. (11 Jul 2018 14:00)  Patient admitted on 7/20/18 and underwent a laparoscopic ventriculo-peritoneal shunt (VPS) placement for normal pressure hydrocephalus (NPH). Certas valve @ 6. Post operatively he did well. He underwent routine post operative management. Post op CT head showed catheter in good position with unchanged ventricle size. PT evaluated him and recommended home PT with a rolling walker. Late morning on 7/22/18 he developed nausea, vomiting, and headache which didn't improve. CTH was done which revealed decreased ventricles. An abdominal xray revealed nonobstructive gas pattern. The following morning he had a bowel movement and was much improved. Seen by surgery team and cleared for discharge. On 7/23/18 he was medically and neurologically stable for discharge to home with home PT. 67 y/o male with  PMH of HTN, HLD, DM Type 2 (unknown HgA1c), BPH . Pt has a history for gait abnormality and imbalance followed by psych and neuro s/p spinal tap in 6/22/2018 with significant improvement with gait and balance.  Presented to PST for scheduled Laparoscopic  Shunt Insertion. (11 Jul 2018 14:00)  Patient admitted on 7/20/18 and underwent a laparoscopic ventriculo-peritoneal shunt (VPS) placement for normal pressure hydrocephalus (NPH). Certas valve @ 6. Post operatively he did well. He underwent routine post operative management. Post op CT head showed catheter in good position with unchanged ventricle size. PT evaluated him and recommended home PT with a rolling walker. Late morning on 7/22/18 he developed nausea, vomiting, and headache . CTH was done which revealed decreased ventricles. An abdominal xray revealed nonobstructive gas pattern. Orthostatic on 7/23/18. Improved with IVF and waleska stockings.  Seen by medicine team inhouse.  On 7/24/18 he was medically and neurologically stable for discharge to home with home PT.

## 2018-07-22 NOTE — CHART NOTE - NSCHARTNOTEFT_GEN_A_CORE
patient remains nauseous, no further vomitting, feels tired and not wanting to go home. shunt valve pumps/refills- if continues nausea may consider CTH.   d/c for today cancelled. Will cont to monitor. Patient remains nauseous, no further vomiting, feels tired and not wanting to go home. shunt valve pumps/refills- if continues nausea may consider CTH.   d/c for today cancelled. Will cont to monitor.

## 2018-07-23 DIAGNOSIS — R00.1 BRADYCARDIA, UNSPECIFIED: ICD-10-CM

## 2018-07-23 DIAGNOSIS — E11.9 TYPE 2 DIABETES MELLITUS WITHOUT COMPLICATIONS: ICD-10-CM

## 2018-07-23 LAB
ALBUMIN SERPL ELPH-MCNC: 3.9 G/DL — SIGNIFICANT CHANGE UP (ref 3.3–5)
ALP SERPL-CCNC: 55 U/L — SIGNIFICANT CHANGE UP (ref 40–120)
ALT FLD-CCNC: 26 U/L — SIGNIFICANT CHANGE UP (ref 10–45)
ANION GAP SERPL CALC-SCNC: 11 MMOL/L — SIGNIFICANT CHANGE UP (ref 5–17)
AST SERPL-CCNC: 22 U/L — SIGNIFICANT CHANGE UP (ref 10–40)
BASOPHILS # BLD AUTO: 0 K/UL — SIGNIFICANT CHANGE UP (ref 0–0.2)
BASOPHILS NFR BLD AUTO: 0.2 % — SIGNIFICANT CHANGE UP (ref 0–2)
BILIRUB SERPL-MCNC: 0.5 MG/DL — SIGNIFICANT CHANGE UP (ref 0.2–1.2)
BUN SERPL-MCNC: 15 MG/DL — SIGNIFICANT CHANGE UP (ref 7–23)
CALCIUM SERPL-MCNC: 8.9 MG/DL — SIGNIFICANT CHANGE UP (ref 8.4–10.5)
CHLORIDE SERPL-SCNC: 101 MMOL/L — SIGNIFICANT CHANGE UP (ref 96–108)
CO2 SERPL-SCNC: 29 MMOL/L — SIGNIFICANT CHANGE UP (ref 22–31)
CREAT SERPL-MCNC: 0.86 MG/DL — SIGNIFICANT CHANGE UP (ref 0.5–1.3)
EOSINOPHIL # BLD AUTO: 0 K/UL — SIGNIFICANT CHANGE UP (ref 0–0.5)
EOSINOPHIL NFR BLD AUTO: 0.4 % — SIGNIFICANT CHANGE UP (ref 0–6)
GLUCOSE BLDC GLUCOMTR-MCNC: 138 MG/DL — HIGH (ref 70–99)
GLUCOSE BLDC GLUCOMTR-MCNC: 138 MG/DL — HIGH (ref 70–99)
GLUCOSE BLDC GLUCOMTR-MCNC: 159 MG/DL — HIGH (ref 70–99)
GLUCOSE BLDC GLUCOMTR-MCNC: 172 MG/DL — HIGH (ref 70–99)
GLUCOSE BLDC GLUCOMTR-MCNC: 188 MG/DL — HIGH (ref 70–99)
GLUCOSE SERPL-MCNC: 202 MG/DL — HIGH (ref 70–99)
HCT VFR BLD CALC: 41.2 % — SIGNIFICANT CHANGE UP (ref 39–50)
HGB BLD-MCNC: 13.7 G/DL — SIGNIFICANT CHANGE UP (ref 13–17)
LYMPHOCYTES # BLD AUTO: 1.1 K/UL — SIGNIFICANT CHANGE UP (ref 1–3.3)
LYMPHOCYTES # BLD AUTO: 15.3 % — SIGNIFICANT CHANGE UP (ref 13–44)
MCHC RBC-ENTMCNC: 28.3 PG — SIGNIFICANT CHANGE UP (ref 27–34)
MCHC RBC-ENTMCNC: 33.2 GM/DL — SIGNIFICANT CHANGE UP (ref 32–36)
MCV RBC AUTO: 85.1 FL — SIGNIFICANT CHANGE UP (ref 80–100)
MONOCYTES # BLD AUTO: 0.5 K/UL — SIGNIFICANT CHANGE UP (ref 0–0.9)
MONOCYTES NFR BLD AUTO: 7.5 % — SIGNIFICANT CHANGE UP (ref 2–14)
NEUTROPHILS # BLD AUTO: 5.5 K/UL — SIGNIFICANT CHANGE UP (ref 1.8–7.4)
NEUTROPHILS NFR BLD AUTO: 76.6 % — SIGNIFICANT CHANGE UP (ref 43–77)
PLATELET # BLD AUTO: 179 K/UL — SIGNIFICANT CHANGE UP (ref 150–400)
POTASSIUM SERPL-MCNC: 4.3 MMOL/L — SIGNIFICANT CHANGE UP (ref 3.5–5.3)
POTASSIUM SERPL-SCNC: 4.3 MMOL/L — SIGNIFICANT CHANGE UP (ref 3.5–5.3)
PROT SERPL-MCNC: 7.2 G/DL — SIGNIFICANT CHANGE UP (ref 6–8.3)
RBC # BLD: 4.85 M/UL — SIGNIFICANT CHANGE UP (ref 4.2–5.8)
RBC # FLD: 11.8 % — SIGNIFICANT CHANGE UP (ref 10.3–14.5)
SODIUM SERPL-SCNC: 141 MMOL/L — SIGNIFICANT CHANGE UP (ref 135–145)
TROPONIN T, HIGH SENSITIVITY RESULT: 6 NG/L — SIGNIFICANT CHANGE UP (ref 0–51)
TROPONIN T, HIGH SENSITIVITY RESULT: 7 NG/L — SIGNIFICANT CHANGE UP (ref 0–51)
WBC # BLD: 7.2 K/UL — SIGNIFICANT CHANGE UP (ref 3.8–10.5)
WBC # FLD AUTO: 7.2 K/UL — SIGNIFICANT CHANGE UP (ref 3.8–10.5)

## 2018-07-23 PROCEDURE — 93010 ELECTROCARDIOGRAM REPORT: CPT

## 2018-07-23 PROCEDURE — 99223 1ST HOSP IP/OBS HIGH 75: CPT

## 2018-07-23 RX ORDER — SODIUM CHLORIDE 9 MG/ML
1000 INJECTION, SOLUTION INTRAVENOUS
Qty: 0 | Refills: 0 | Status: DISCONTINUED | OUTPATIENT
Start: 2018-07-23 | End: 2018-07-24

## 2018-07-23 RX ORDER — SODIUM CHLORIDE 9 MG/ML
1000 INJECTION INTRAMUSCULAR; INTRAVENOUS; SUBCUTANEOUS ONCE
Qty: 0 | Refills: 0 | Status: DISCONTINUED | OUTPATIENT
Start: 2018-07-23 | End: 2018-07-23

## 2018-07-23 RX ORDER — SODIUM CHLORIDE 9 MG/ML
1000 INJECTION, SOLUTION INTRAVENOUS
Qty: 0 | Refills: 0 | Status: DISCONTINUED | OUTPATIENT
Start: 2018-07-23 | End: 2018-07-23

## 2018-07-23 RX ORDER — DEXTROSE MONOHYDRATE, SODIUM CHLORIDE, AND POTASSIUM CHLORIDE 50; .745; 4.5 G/1000ML; G/1000ML; G/1000ML
1000 INJECTION, SOLUTION INTRAVENOUS
Qty: 0 | Refills: 0 | Status: DISCONTINUED | OUTPATIENT
Start: 2018-07-23 | End: 2018-07-23

## 2018-07-23 RX ORDER — POLYETHYLENE GLYCOL 3350 17 G/17G
17 POWDER, FOR SOLUTION ORAL DAILY
Qty: 0 | Refills: 0 | Status: DISCONTINUED | OUTPATIENT
Start: 2018-07-23 | End: 2018-07-24

## 2018-07-23 RX ADMIN — ATORVASTATIN CALCIUM 20 MILLIGRAM(S): 80 TABLET, FILM COATED ORAL at 21:26

## 2018-07-23 RX ADMIN — POLYETHYLENE GLYCOL 3350 17 GRAM(S): 17 POWDER, FOR SOLUTION ORAL at 13:19

## 2018-07-23 RX ADMIN — Medication 100 MILLIGRAM(S): at 21:26

## 2018-07-23 RX ADMIN — TAMSULOSIN HYDROCHLORIDE 0.4 MILLIGRAM(S): 0.4 CAPSULE ORAL at 21:26

## 2018-07-23 RX ADMIN — PANTOPRAZOLE SODIUM 40 MILLIGRAM(S): 20 TABLET, DELAYED RELEASE ORAL at 13:20

## 2018-07-23 RX ADMIN — Medication 100 MILLIGRAM(S): at 14:14

## 2018-07-23 RX ADMIN — ENOXAPARIN SODIUM 40 MILLIGRAM(S): 100 INJECTION SUBCUTANEOUS at 21:26

## 2018-07-23 RX ADMIN — Medication 650 MILLIGRAM(S): at 09:08

## 2018-07-23 RX ADMIN — Medication 650 MILLIGRAM(S): at 19:30

## 2018-07-23 RX ADMIN — Medication 1: at 13:19

## 2018-07-23 RX ADMIN — ONDANSETRON 4 MILLIGRAM(S): 8 TABLET, FILM COATED ORAL at 10:35

## 2018-07-23 NOTE — CONSULT NOTE ADULT - SUBJECTIVE AND OBJECTIVE BOX
Percy Huffman  Pager [phone]  Office [phone]    CC: nausea, diaphoresis    HPI:  67 y/o male with hx as below s/p  shunt for hydrocephalus with recurrent episodes of nausea      PAST MEDICAL & SURGICAL HISTORY:  PARMINDER on CPAP: dx 7 years ago ,never used CPAP  Hydrocephalus  Ptosis of eyelid, bilateral  benign tremor  DM type 2 (diabetes mellitus, type 2)  BPH (benign prostatic hyperplasia)  Hypercholesterolemia  Varicose veins: h/o varicose vein surgery- 4 years ago  H/O colonoscopy with polypectomy: 2018  Benign lymph node bx  varicose vein treatment      Review of Systems:   CONSTITUTIONAL: No fever, weight loss, or fatigue  EYES: No eye pain, visual disturbances, or discharge  ENMT:  No difficulty hearing, tinnitus, vertigo; No sinus or throat pain  NECK: No pain or stiffness  BREASTS: No pain, masses, or nipple discharge  RESPIRATORY: No cough, wheezing, chills or hemoptysis; No shortness of breath  CARDIOVASCULAR: No chest pain, palpitations, dizziness, or leg swelling  GASTROINTESTINAL: No abdominal or epigastric pain. No nausea, vomiting, or hematemesis; No diarrhea or constipation. No melena or hematochezia.  GENITOURINARY: No dysuria, frequency, hematuria, or incontinence  NEUROLOGICAL: No headaches, memory loss, loss of strength, numbness, or tremors  SKIN: No itching, burning, rashes, or lesions   LYMPH NODES: No enlarged glands  ENDOCRINE: No heat or cold intolerance; No hair loss  MUSCULOSKELETAL: No joint pain or swelling; No muscle, back, or extremity pain  PSYCHIATRIC: No depression, anxiety, mood swings, or difficulty sleeping  HEME/LYMPH: No easy bruising, or bleeding gums  ALLERY AND IMMUNOLOGIC: No hives or eczema    Allergies    latex (Hives; Rash)  No Known Drug Allergies    Intolerances        Social History:     FAMILY HISTORY:      MEDICATIONS  (STANDING):  atorvastatin 20 milliGRAM(s) Oral at bedtime  dextrose 5%. 1000 milliLiter(s) (50 mL/Hr) IV Continuous <Continuous>  dextrose 50% Injectable 12.5 Gram(s) IV Push once  dextrose 50% Injectable 25 Gram(s) IV Push once  dextrose 50% Injectable 25 Gram(s) IV Push once  docusate sodium 100 milliGRAM(s) Oral three times a day  enoxaparin Injectable 40 milliGRAM(s) SubCutaneous at bedtime  insulin lispro (HumaLOG) corrective regimen sliding scale   SubCutaneous three times a day before meals  insulin lispro (HumaLOG) corrective regimen sliding scale   SubCutaneous at bedtime  pantoprazole    Tablet 40 milliGRAM(s) Oral daily  polyethylene glycol 3350 17 Gram(s) Oral daily  propranolol 10 milliGRAM(s) Oral two times a day  tamsulosin 0.4 milliGRAM(s) Oral at bedtime    MEDICATIONS  (PRN):  acetaminophen   Tablet 650 milliGRAM(s) Oral every 6 hours PRN For Temp greater than 38 C (100.4 F)  bisacodyl Suppository 10 milliGRAM(s) Rectal daily PRN Constipation  dextrose 40% Gel 15 Gram(s) Oral once PRN Blood Glucose LESS THAN 70 milliGRAM(s)/deciliter  glucagon  Injectable 1 milliGRAM(s) IntraMuscular once PRN Glucose LESS THAN 70 milligrams/deciliter  ondansetron Injectable 4 milliGRAM(s) IV Push every 6 hours PRN Nausea and/or Vomiting  traMADol 50 milliGRAM(s) Oral every 6 hours PRN Severe Pain (7 - 10)      Vital Signs Last 24 Hrs  T(C): 36.6 (23 Jul 2018 10:23), Max: 36.7 (23 Jul 2018 00:20)  T(F): 97.8 (23 Jul 2018 10:23), Max: 98.1 (23 Jul 2018 00:20)  HR: 55 (23 Jul 2018 10:23) (55 - 76)  BP: 138/78 (23 Jul 2018 10:23) (101/61 - 138/78)  BP(mean): --  RR: 16 (23 Jul 2018 10:23) (16 - 18)  SpO2: 95% (23 Jul 2018 10:23) (94% - 97%)  CAPILLARY BLOOD GLUCOSE      POCT Blood Glucose.: 159 mg/dL (23 Jul 2018 12:44)  POCT Blood Glucose.: 188 mg/dL (23 Jul 2018 10:17)  POCT Blood Glucose.: 138 mg/dL (23 Jul 2018 08:44)  POCT Blood Glucose.: 135 mg/dL (22 Jul 2018 21:37)  POCT Blood Glucose.: 140 mg/dL (22 Jul 2018 17:09)    I&O's Summary    22 Jul 2018 07:01  -  23 Jul 2018 07:00  --------------------------------------------------------  IN: 960 mL / OUT: 402 mL / NET: 558 mL        PHYSICAL EXAM:  GENERAL: NAD, well-developed  HEAD:  Atraumatic, Normocephalic  EYES: EOMI, PERRLA, conjunctiva and sclera clear  NECK: Supple, No JVD  CHEST/LUNG: Clear to auscultation bilaterally; No wheeze  HEART: Regular rate and rhythm; No murmurs, rubs, or gallops  ABDOMEN: Soft, Nontender, Nondistended; Bowel sounds present  EXTREMITIES:  2+ Peripheral Pulses, No clubbing, cyanosis, or edema  PSYCH: AAOx3  NEUROLOGY: non-focal  SKIN: No rashes or lesions    LABS:                        13.7   7.2   )-----------( 179      ( 23 Jul 2018 12:19 )             41.2                     RADIOLOGY & ADDITIONAL TESTS:    Imaging Personally Reviewed:    Consultant(s) Notes Reviewed:      Care Discussed with Consultants/Other Providers: Percy Huffman  Pager [phone]  Office [phone]    CC: nausea, diaphoresis    HPI:  67 y/o male with hx as below s/p  shunt for hydrocephalus with recurrent episodes of nausea and pallor. First episode occurred yest, 6 hrs after Tramadol ingestion: pt became hot and nauseous and went to bathroom to have a BM but only tried to vomit with no results; he came back and sat in his chair where he became pale and diaphoretic c persistent nausea and feeling hot, lasting 30 min. Approx 8 hrs later, he developed       PAST MEDICAL & SURGICAL HISTORY:  PARMINDER on CPAP: dx 7 years ago ,never used CPAP  Hydrocephalus  Ptosis of eyelid, bilateral  benign tremor  DM type 2 (diabetes mellitus, type 2)  BPH (benign prostatic hyperplasia)  Hypercholesterolemia  Varicose veins: h/o varicose vein surgery- 4 years ago  H/O colonoscopy with polypectomy: 2018  Benign lymph node bx  varicose vein treatment      Review of Systems:   CONSTITUTIONAL: No fever, weight loss, or fatigue  EYES: No eye pain, visual disturbances, or discharge  ENMT:  No difficulty hearing, tinnitus, vertigo; No sinus or throat pain  NECK: No pain or stiffness  BREASTS: No pain, masses, or nipple discharge  RESPIRATORY: No cough, wheezing, chills or hemoptysis; No shortness of breath  CARDIOVASCULAR: No chest pain, palpitations, dizziness, or leg swelling  GASTROINTESTINAL: No abdominal or epigastric pain. No nausea, vomiting, or hematemesis; No diarrhea or constipation. No melena or hematochezia.  GENITOURINARY: No dysuria, frequency, hematuria, or incontinence  NEUROLOGICAL: No headaches, memory loss, loss of strength, numbness, or tremors  SKIN: No itching, burning, rashes, or lesions   LYMPH NODES: No enlarged glands  ENDOCRINE: No heat or cold intolerance; No hair loss  MUSCULOSKELETAL: No joint pain or swelling; No muscle, back, or extremity pain  PSYCHIATRIC: No depression, anxiety, mood swings, or difficulty sleeping  HEME/LYMPH: No easy bruising, or bleeding gums  ALLERY AND IMMUNOLOGIC: No hives or eczema    Allergies    latex (Hives; Rash)  No Known Drug Allergies    Intolerances        Social History:     FAMILY HISTORY:      MEDICATIONS  (STANDING):  atorvastatin 20 milliGRAM(s) Oral at bedtime  dextrose 5%. 1000 milliLiter(s) (50 mL/Hr) IV Continuous <Continuous>  dextrose 50% Injectable 12.5 Gram(s) IV Push once  dextrose 50% Injectable 25 Gram(s) IV Push once  dextrose 50% Injectable 25 Gram(s) IV Push once  docusate sodium 100 milliGRAM(s) Oral three times a day  enoxaparin Injectable 40 milliGRAM(s) SubCutaneous at bedtime  insulin lispro (HumaLOG) corrective regimen sliding scale   SubCutaneous three times a day before meals  insulin lispro (HumaLOG) corrective regimen sliding scale   SubCutaneous at bedtime  pantoprazole    Tablet 40 milliGRAM(s) Oral daily  polyethylene glycol 3350 17 Gram(s) Oral daily  propranolol 10 milliGRAM(s) Oral two times a day  tamsulosin 0.4 milliGRAM(s) Oral at bedtime    MEDICATIONS  (PRN):  acetaminophen   Tablet 650 milliGRAM(s) Oral every 6 hours PRN For Temp greater than 38 C (100.4 F)  bisacodyl Suppository 10 milliGRAM(s) Rectal daily PRN Constipation  dextrose 40% Gel 15 Gram(s) Oral once PRN Blood Glucose LESS THAN 70 milliGRAM(s)/deciliter  glucagon  Injectable 1 milliGRAM(s) IntraMuscular once PRN Glucose LESS THAN 70 milligrams/deciliter  ondansetron Injectable 4 milliGRAM(s) IV Push every 6 hours PRN Nausea and/or Vomiting  traMADol 50 milliGRAM(s) Oral every 6 hours PRN Severe Pain (7 - 10)      Vital Signs Last 24 Hrs  T(C): 36.6 (23 Jul 2018 10:23), Max: 36.7 (23 Jul 2018 00:20)  T(F): 97.8 (23 Jul 2018 10:23), Max: 98.1 (23 Jul 2018 00:20)  HR: 55 (23 Jul 2018 10:23) (55 - 76)  BP: 138/78 (23 Jul 2018 10:23) (101/61 - 138/78)  BP(mean): --  RR: 16 (23 Jul 2018 10:23) (16 - 18)  SpO2: 95% (23 Jul 2018 10:23) (94% - 97%)  CAPILLARY BLOOD GLUCOSE      POCT Blood Glucose.: 159 mg/dL (23 Jul 2018 12:44)  POCT Blood Glucose.: 188 mg/dL (23 Jul 2018 10:17)  POCT Blood Glucose.: 138 mg/dL (23 Jul 2018 08:44)  POCT Blood Glucose.: 135 mg/dL (22 Jul 2018 21:37)  POCT Blood Glucose.: 140 mg/dL (22 Jul 2018 17:09)    I&O's Summary    22 Jul 2018 07:01  -  23 Jul 2018 07:00  --------------------------------------------------------  IN: 960 mL / OUT: 402 mL / NET: 558 mL        PHYSICAL EXAM:  GENERAL: NAD, well-developed  HEAD:  Atraumatic, Normocephalic  EYES: EOMI, PERRLA, conjunctiva and sclera clear  NECK: Supple, No JVD  CHEST/LUNG: Clear to auscultation bilaterally; No wheeze  HEART: Regular rate and rhythm; No murmurs, rubs, or gallops  ABDOMEN: Soft, Nontender, Nondistended; Bowel sounds present  EXTREMITIES:  2+ Peripheral Pulses, No clubbing, cyanosis, or edema  PSYCH: AAOx3  NEUROLOGY: non-focal  SKIN: No rashes or lesions    LABS:                        13.7   7.2   )-----------( 179      ( 23 Jul 2018 12:19 )             41.2                     RADIOLOGY & ADDITIONAL TESTS:    Imaging Personally Reviewed:    Consultant(s) Notes Reviewed:      Care Discussed with Consultants/Other Providers: Percy Huffman  Pager [phone]  Office [phone]    CC: nausea, diaphoresis    HPI:  69 y/o male with hx as below s/p  shunt for hydrocephalus with recurrent episodes of nausea and pallor. First episode occurred yest, 6 hrs after Tramadol ingestion: pt became hot and nauseous and went to bathroom to have a BM but only tried to vomit with no results; he came back and sat in his chair where he became pale and diaphoretic c persistent nausea and feeling hot, lasting 30 min. Approx 8 hrs later, he developed nausea and pallor again, that lasted 30 min without diaphoresis. He had a small-medium BM last night. Today, 15 min after passing a lot of flatus, he came back to his chair and developed nausea and pallor again, per wife, lasting a few min. Never had CP, SOB, abd pain with any of the episodes.      PAST MEDICAL & SURGICAL HISTORY:  PARMINDER on CPAP: dx 7 years ago ,never used CPAP  Hydrocephalus  Ptosis of eyelid, bilateral  benign tremor  DM type 2 (diabetes mellitus, type 2)  BPH (benign prostatic hyperplasia)  Hypercholesterolemia  Varicose veins: h/o varicose vein surgery- 4 years ago  H/O colonoscopy with polypectomy: 2018  Benign lymph node bx  varicose vein treatment      Review of Systems:   CONSTITUTIONAL: No fever, weight loss, or fatigue  EYES: No eye pain, visual disturbances, or discharge  ENMT:  No difficulty hearing, tinnitus, vertigo; No sinus or throat pain  NECK: No pain or stiffness  BREASTS: No pain, masses, or nipple discharge  RESPIRATORY: No cough, wheezing, chills or hemoptysis; No shortness of breath  CARDIOVASCULAR: No chest pain, palpitations, dizziness, or leg swelling  GASTROINTESTINAL: No abdominal or epigastric pain. No nausea, vomiting, or hematemesis; No diarrhea or constipation. No melena or hematochezia.  GENITOURINARY: No dysuria, frequency, hematuria, or incontinence  NEUROLOGICAL: No headaches, memory loss, loss of strength, numbness, or tremors  SKIN: No itching, burning, rashes, or lesions   LYMPH NODES: No enlarged glands  ENDOCRINE: No heat or cold intolerance; No hair loss  MUSCULOSKELETAL: No joint pain or swelling; No muscle, back, or extremity pain  PSYCHIATRIC: No depression, anxiety, mood swings, or difficulty sleeping  HEME/LYMPH: No easy bruising, or bleeding gums  ALLERY AND IMMUNOLOGIC: No hives or eczema    Allergies    latex (Hives; Rash)  No Known Drug Allergies    Intolerances        Social History:     FAMILY HISTORY:      MEDICATIONS  (STANDING):  atorvastatin 20 milliGRAM(s) Oral at bedtime  dextrose 5%. 1000 milliLiter(s) (50 mL/Hr) IV Continuous <Continuous>  dextrose 50% Injectable 12.5 Gram(s) IV Push once  dextrose 50% Injectable 25 Gram(s) IV Push once  dextrose 50% Injectable 25 Gram(s) IV Push once  docusate sodium 100 milliGRAM(s) Oral three times a day  enoxaparin Injectable 40 milliGRAM(s) SubCutaneous at bedtime  insulin lispro (HumaLOG) corrective regimen sliding scale   SubCutaneous three times a day before meals  insulin lispro (HumaLOG) corrective regimen sliding scale   SubCutaneous at bedtime  pantoprazole    Tablet 40 milliGRAM(s) Oral daily  polyethylene glycol 3350 17 Gram(s) Oral daily  propranolol 10 milliGRAM(s) Oral two times a day  tamsulosin 0.4 milliGRAM(s) Oral at bedtime    MEDICATIONS  (PRN):  acetaminophen   Tablet 650 milliGRAM(s) Oral every 6 hours PRN For Temp greater than 38 C (100.4 F)  bisacodyl Suppository 10 milliGRAM(s) Rectal daily PRN Constipation  dextrose 40% Gel 15 Gram(s) Oral once PRN Blood Glucose LESS THAN 70 milliGRAM(s)/deciliter  glucagon  Injectable 1 milliGRAM(s) IntraMuscular once PRN Glucose LESS THAN 70 milligrams/deciliter  ondansetron Injectable 4 milliGRAM(s) IV Push every 6 hours PRN Nausea and/or Vomiting  traMADol 50 milliGRAM(s) Oral every 6 hours PRN Severe Pain (7 - 10)      Vital Signs Last 24 Hrs  T(C): 36.6 (23 Jul 2018 10:23), Max: 36.7 (23 Jul 2018 00:20)  T(F): 97.8 (23 Jul 2018 10:23), Max: 98.1 (23 Jul 2018 00:20)  HR: 55 (23 Jul 2018 10:23) (55 - 76)  BP: 138/78 (23 Jul 2018 10:23) (101/61 - 138/78)  BP(mean): --  RR: 16 (23 Jul 2018 10:23) (16 - 18)  SpO2: 95% (23 Jul 2018 10:23) (94% - 97%)  CAPILLARY BLOOD GLUCOSE      POCT Blood Glucose.: 159 mg/dL (23 Jul 2018 12:44)  POCT Blood Glucose.: 188 mg/dL (23 Jul 2018 10:17)  POCT Blood Glucose.: 138 mg/dL (23 Jul 2018 08:44)  POCT Blood Glucose.: 135 mg/dL (22 Jul 2018 21:37)  POCT Blood Glucose.: 140 mg/dL (22 Jul 2018 17:09)    I&O's Summary    22 Jul 2018 07:01  -  23 Jul 2018 07:00  --------------------------------------------------------  IN: 960 mL / OUT: 402 mL / NET: 558 mL        PHYSICAL EXAM:  GENERAL: NAD, well-developed  HEAD:  Atraumatic, Normocephalic  EYES: EOMI, PERRLA, conjunctiva and sclera clear  NECK: Supple, No JVD  CHEST/LUNG: Clear to auscultation bilaterally; No wheeze  HEART: Regular rate and rhythm; No murmurs, rubs, or gallops  ABDOMEN: Soft, Nontender, Nondistended; Bowel sounds present  EXTREMITIES:  2+ Peripheral Pulses, No clubbing, cyanosis, or edema  PSYCH: AAOx3  NEUROLOGY: non-focal  SKIN: No rashes or lesions    LABS:                        13.7   7.2   )-----------( 179      ( 23 Jul 2018 12:19 )             41.2                     RADIOLOGY & ADDITIONAL TESTS:    Imaging Personally Reviewed:    Consultant(s) Notes Reviewed:      Care Discussed with Consultants/Other Providers: Percy Huffman  Pager [phone]  Office [phone]    CC: nausea, diaphoresis    HPI:  69 y/o male with hx as below s/p  shunt for hydrocephalus with recurrent episodes of nausea and pallor. First episode occurred yest, 6 hrs after Tramadol ingestion: pt became hot and nauseous and went to bathroom to have a BM but only tried to vomit with no results; he came back and sat in his chair where he became pale and diaphoretic c persistent nausea and feeling hot, lasting 30 min. Approx 8 hrs later, he developed nausea and pallor again, that lasted 30 min without diaphoresis. He had a small-medium BM last night. Today, 15 min after passing a lot of flatus, he came back to his chair and developed nausea and pallor again, per wife, lasting a few min. Never had CP, SOB, abd pain with any of the episodes.      PAST MEDICAL & SURGICAL HISTORY:  PARMINDER on CPAP: dx 7 years ago ,never used CPAP  Hydrocephalus  Ptosis of eyelid, bilateral  benign tremor  DM type 2 (diabetes mellitus, type 2)  BPH (benign prostatic hyperplasia)  Hypercholesterolemia  Varicose veins: h/o varicose vein surgery- 4 years ago  H/O colonoscopy with polypectomy: 2018  Benign lymph node bx  varicose vein treatment      Review of Systems:   CONSTITUTIONAL: No fever, weight loss, or fatigue  EYES: No eye pain, visual disturbances, or discharge  ENMT:  No difficulty hearing, tinnitus, vertigo; No sinus or throat pain  NECK: No pain or stiffness  BREASTS: No pain, masses, or nipple discharge  RESPIRATORY: No cough, wheezing, chills or hemoptysis; No shortness of breath  CARDIOVASCULAR: No chest pain, palpitations, dizziness, or leg swelling  GASTROINTESTINAL: No abdominal or epigastric pain. No nausea, vomiting, or hematemesis; No diarrhea or constipation. No melena or hematochezia.  GENITOURINARY: No dysuria, frequency, hematuria, or incontinence  NEUROLOGICAL: No headaches, memory loss, loss of strength, numbness, or tremors  SKIN: No itching, burning, rashes, or lesions   LYMPH NODES: No enlarged glands  ENDOCRINE: No heat or cold intolerance; No hair loss  MUSCULOSKELETAL: No joint pain or swelling; No muscle, back, or extremity pain  PSYCHIATRIC: No depression, anxiety, mood swings, or difficulty sleeping  HEME/LYMPH: No easy bruising, or bleeding gums  ALLERY AND IMMUNOLOGIC: No hives or eczema    Allergies    latex (Hives; Rash)  No Known Drug Allergies      Social History:   No tobacco, ETOH. .     FAMILY HISTORY:  Father  in his 60s of unknown causes  Mother  dementia      MEDICATIONS  (STANDING):  atorvastatin 20 milliGRAM(s) Oral at bedtime  dextrose 5%. 1000 milliLiter(s) (50 mL/Hr) IV Continuous <Continuous>  dextrose 50% Injectable 12.5 Gram(s) IV Push once  dextrose 50% Injectable 25 Gram(s) IV Push once  dextrose 50% Injectable 25 Gram(s) IV Push once  docusate sodium 100 milliGRAM(s) Oral three times a day  enoxaparin Injectable 40 milliGRAM(s) SubCutaneous at bedtime  insulin lispro (HumaLOG) corrective regimen sliding scale   SubCutaneous three times a day before meals  insulin lispro (HumaLOG) corrective regimen sliding scale   SubCutaneous at bedtime  pantoprazole    Tablet 40 milliGRAM(s) Oral daily  polyethylene glycol 3350 17 Gram(s) Oral daily  propranolol 10 milliGRAM(s) Oral two times a day  tamsulosin 0.4 milliGRAM(s) Oral at bedtime    MEDICATIONS  (PRN):  acetaminophen   Tablet 650 milliGRAM(s) Oral every 6 hours PRN For Temp greater than 38 C (100.4 F)  bisacodyl Suppository 10 milliGRAM(s) Rectal daily PRN Constipation  dextrose 40% Gel 15 Gram(s) Oral once PRN Blood Glucose LESS THAN 70 milliGRAM(s)/deciliter  glucagon  Injectable 1 milliGRAM(s) IntraMuscular once PRN Glucose LESS THAN 70 milligrams/deciliter  ondansetron Injectable 4 milliGRAM(s) IV Push every 6 hours PRN Nausea and/or Vomiting  traMADol 50 milliGRAM(s) Oral every 6 hours PRN Severe Pain (7 - 10)      Vital Signs Last 24 Hrs  T(C): 36.6 (2018 10:23), Max: 36.7 (2018 00:20)  T(F): 97.8 (2018 10:23), Max: 98.1 (2018 00:20)  HR: 55 (2018 10:23) (55 - 76)  BP: 138/78 (2018 10:23) (101/61 - 138/78)  BP(mean): --  RR: 16 (2018 10:23) (16 - 18)  SpO2: 95% (2018 10:23) (94% - 97%)  CAPILLARY BLOOD GLUCOSE      POCT Blood Glucose.: 159 mg/dL (2018 12:44)  POCT Blood Glucose.: 188 mg/dL (2018 10:17)  POCT Blood Glucose.: 138 mg/dL (2018 08:44)  POCT Blood Glucose.: 135 mg/dL (2018 21:37)  POCT Blood Glucose.: 140 mg/dL (2018 17:09)    I&O's Summary    2018 07:01  -  2018 07:00  --------------------------------------------------------  IN: 960 mL / OUT: 402 mL / NET: 558 mL        PHYSICAL EXAM:  GENERAL: NAD, well-developed  HEAD:  Atraumatic, Normocephalic  EYES: EOMI, PERRLA, conjunctiva and sclera clear  NECK: Supple, No JVD  CHEST/LUNG: Clear to auscultation bilaterally; No wheeze  HEART: Regular rate and rhythm; No murmurs, rubs, or gallops  ABDOMEN: Soft, Nontender, Nondistended; Bowel sounds present; surgical sites clean  EXTREMITIES:  No clubbing, cyanosis, or edema  PSYCH: AAOx3  NEUROLOGY: non-focal  SKIN: surgical sites clean    LABS:                        13.7   7.2   )-----------( 179      ( 2018 12:19 )             41.2             EKG reviewed by me: TATIANA SIGALA III.         RADIOLOGY & ADDITIONAL TESTS:    Imaging Personally Reviewed:    Consultant(s) Notes Reviewed:      Care Discussed with Consultants/Other Providers: neurosurg

## 2018-07-23 NOTE — CHART NOTE - NSCHARTNOTEFT_GEN_A_CORE
Patient had episode of nausea and not feeling well overall.  Hospitalist asked to see patient. EKG and troponins neg.  Denies chest pains or SOB.   orthostatics checked- barely meets criteria, however will give IVF bolus and place on D5 NS 50cc/h given poor PO intake given patient is diabetic.   placed on tele overnight to r/o any events.    Vital Signs Last 24 Hrs  T(C): 36.8 (23 Jul 2018 15:44), Max: 36.8 (23 Jul 2018 15:44)  T(F): 98.2 (23 Jul 2018 15:44), Max: 98.2 (23 Jul 2018 15:44)  HR: 64 (23 Jul 2018 15:44) (55 - 76)  BP: 117/73 (23 Jul 2018 15:44) (101/61 - 138/78)  BP(mean): --  RR: 18 (23 Jul 2018 15:44) (16 - 18)  SpO2: 98% (23 Jul 2018 15:44) (94% - 98%)                          13.7   7.2   )-----------( 179      ( 23 Jul 2018 12:19 )             41.2   07-23    141  |  101  |  15  ----------------------------<  202<H>  4.3   |  29  |  0.86    Ca    8.9      23 Jul 2018 12:19    TPro  7.2  /  Alb  3.9  /  TBili  0.5  /  DBili  x   /  AST  22  /  ALT  26  /  AlkPhos  55  07-23    Troponin T, High Sensitivity Result: 7: Rapid upward or downward changes in high-sensitivity troponin levels  suggest acute myocardial injury. Renal impairment may cause sustained  troponin elevations.  Normal: <6 - 14 ng/L  Indeterminate: 15-51 ng/L  Elevated: > 51 ng/L  See http://labs/test/[email or web address] on the [employer] intranet for more  information ng/L (07.23.18 @ 12:19)    will hold off on d/c for now as discussed with Dr Weldon at bedside.  spectra #[id number]

## 2018-07-23 NOTE — CONSULT NOTE ADULT - ASSESSMENT
67 yo M h/o DM2, PARMINDER, NPH, HLD s/p  shunt for hydrocephalus with recurrent episodes of nausea and pallor in settings of attempted BMs or passing flatus.

## 2018-07-23 NOTE — CONSULT NOTE ADULT - PROBLEM SELECTOR RECOMMENDATION 9
check trop x 2, 1 hr apart. Miralax to help pass potential mod-large stool burden seen on AXR. check trop x 2, 1 hr apart. Miralax to help pass potential mod-large stool burden seen on AXR. check orthostatics after pt is back in bed

## 2018-07-24 VITALS
RESPIRATION RATE: 18 BRPM | HEART RATE: 66 BPM | OXYGEN SATURATION: 98 % | TEMPERATURE: 98 F | SYSTOLIC BLOOD PRESSURE: 125 MMHG | DIASTOLIC BLOOD PRESSURE: 75 MMHG

## 2018-07-24 DIAGNOSIS — I95.1 ORTHOSTATIC HYPOTENSION: ICD-10-CM

## 2018-07-24 LAB
GLUCOSE BLDC GLUCOMTR-MCNC: 154 MG/DL — HIGH (ref 70–99)
GLUCOSE BLDC GLUCOMTR-MCNC: 214 MG/DL — HIGH (ref 70–99)

## 2018-07-24 PROCEDURE — 93005 ELECTROCARDIOGRAM TRACING: CPT

## 2018-07-24 PROCEDURE — 74019 RADEX ABDOMEN 2 VIEWS: CPT

## 2018-07-24 PROCEDURE — 70450 CT HEAD/BRAIN W/O DYE: CPT

## 2018-07-24 PROCEDURE — 82962 GLUCOSE BLOOD TEST: CPT

## 2018-07-24 PROCEDURE — 84484 ASSAY OF TROPONIN QUANT: CPT

## 2018-07-24 PROCEDURE — 99233 SBSQ HOSP IP/OBS HIGH 50: CPT

## 2018-07-24 PROCEDURE — 80053 COMPREHEN METABOLIC PANEL: CPT

## 2018-07-24 PROCEDURE — 85027 COMPLETE CBC AUTOMATED: CPT

## 2018-07-24 PROCEDURE — 80048 BASIC METABOLIC PNL TOTAL CA: CPT

## 2018-07-24 PROCEDURE — C1889: CPT

## 2018-07-24 PROCEDURE — 97161 PT EVAL LOW COMPLEX 20 MIN: CPT

## 2018-07-24 RX ORDER — SODIUM CHLORIDE 9 MG/ML
500 INJECTION INTRAMUSCULAR; INTRAVENOUS; SUBCUTANEOUS ONCE
Qty: 0 | Refills: 0 | Status: DISCONTINUED | OUTPATIENT
Start: 2018-07-24 | End: 2018-07-24

## 2018-07-24 RX ORDER — POLYETHYLENE GLYCOL 3350 17 G/17G
17 POWDER, FOR SOLUTION ORAL
Qty: 0 | Refills: 0 | COMMUNITY
Start: 2018-07-24

## 2018-07-24 RX ADMIN — Medication 10 MILLIGRAM(S): at 06:00

## 2018-07-24 RX ADMIN — Medication 2: at 12:58

## 2018-07-24 RX ADMIN — Medication 1: at 08:44

## 2018-07-24 RX ADMIN — PANTOPRAZOLE SODIUM 40 MILLIGRAM(S): 20 TABLET, DELAYED RELEASE ORAL at 12:56

## 2018-07-24 RX ADMIN — Medication 100 MILLIGRAM(S): at 06:00

## 2018-07-24 RX ADMIN — POLYETHYLENE GLYCOL 3350 17 GRAM(S): 17 POWDER, FOR SOLUTION ORAL at 12:56

## 2018-07-24 RX ADMIN — Medication 100 MILLIGRAM(S): at 12:57

## 2018-07-24 RX ADMIN — Medication 650 MILLIGRAM(S): at 08:47

## 2018-07-24 NOTE — PROGRESS NOTE ADULT - SUBJECTIVE AND OBJECTIVE BOX
CHIEF COMPLAINT: patient sitting in chair, feeling well anxious to go home  states abdominal pain worse than head pain, report + flatus and tolerating meals well    Vital Signs Last 24 Hrs  T(C): 36.8 (22 Jul 2018 07:48), Max: 37.6 (21 Jul 2018 23:51)  T(F): 98.3 (22 Jul 2018 07:48), Max: 99.7 (21 Jul 2018 23:51)  HR: 66 (22 Jul 2018 08:30) (64 - 70)  BP: 116/74 (22 Jul 2018 08:30) (106/69 - 118/75)  BP(mean): --  RR: 18 (22 Jul 2018 07:48) (18 - 18)  SpO2: 94% (22 Jul 2018 08:30) (94% - 97%)    PHYSICAL EXAM:    General: No Acute Distress     Neurological: Awake, alert oriented to person, place and time, Following Commands, PERRL, EOMI, Face Symmetrical, Speech Fluent, Moving all extremities, Muscle Strength normal in all four extremities, No Drift, Sensation to Light Touch Intact    Pulmonary: Clear to Auscultation, No Rales, No Rhonchi, No Wheezes     Cardiovascular: S1, S2, Regular Rate and Rhythm     Gastrointestinal: Soft, Nontender, Nondistended, + BS     Incision: +staples c/d/i to head, + steri strips to abdomen    LABS:                        13.6   8.9   )-----------( 184      ( 21 Jul 2018 12:17 )             41.2    07-21    139  |  105  |  16  ----------------------------<  190<H>  4.4   |  24  |  0.84    Ca    8.7      21 Jul 2018 12:17      Hemoglobin A1C, Whole Blood: 7.0 % (07-11 @ 19:32)      07-21 @ 07:01  -  07-22 @ 07:00  --------------------------------------------------------  IN: 600 mL / OUT: 1100 mL / NET: -500 mL        MEDICATIONS:  Anticoagulation:  enoxaparin Injectable 40 milliGRAM(s) SubCutaneous at bedtime    Endo:  atorvastatin 20 milliGRAM(s) Oral at bedtime  dextrose 40% Gel 15 Gram(s) Oral once PRN  dextrose 50% Injectable 12.5 Gram(s) IV Push once  dextrose 50% Injectable 25 Gram(s) IV Push once  dextrose 50% Injectable 25 Gram(s) IV Push once  glucagon  Injectable 1 milliGRAM(s) IntraMuscular once PRN  insulin lispro (HumaLOG) corrective regimen sliding scale   SubCutaneous three times a day before meals  insulin lispro (HumaLOG) corrective regimen sliding scale   SubCutaneous at bedtime    Neuro:  acetaminophen   Tablet 650 milliGRAM(s) Oral every 6 hours PRN For Temp greater than 38 C (100.4 F)  ondansetron Injectable 4 milliGRAM(s) IV Push every 6 hours PRN Nausea and/or Vomiting  traMADol 50 milliGRAM(s) Oral every 6 hours PRN Severe Pain (7 - 10)    Cardiac:  propranolol 10 milliGRAM(s) Oral two times a day  tamsulosin 0.4 milliGRAM(s) Oral at bedtime    GI/:  docusate sodium 100 milliGRAM(s) Oral three times a day  pantoprazole    Tablet 40 milliGRAM(s) Oral daily    Other:   dextrose 5%. 1000 milliLiter(s) IV Continuous <Continuous>  sodium chloride 0.9% with potassium chloride 20 mEq/L 1000 milliLiter(s) IV Continuous <Continuous>    DIET: [] Regular [x] CCD [] Renal [] Puree [] Dysphagia [] Tube Feeds:     IMAGING:   < from: CT Head No Cont (07.20.18 @ 18:31) >    INTERPRETATION:  INDICATIONS:  postop status post  shunt placement  .    TECHNIQUE:  Serial axial images were obtained from the skull base to the   vertex without intravenous contrast. Coronal and sagittal reformatted   images were obtained.    COMPARISON EXAMINATION: 4/21/2018 MR scan    FINDINGS:    VENTRICLES AND SULCI:  Prominent in size. There is disproportionate   ventricular prominence without significantchange from the prior exam. A   right parietal approach intraventricular catheter is in place with the   tip just to the left of the septum pellucidum.    INTRA-AXIAL:  No mass, blood or abnormal attenuation is seen.    EXTRA-AXIAL:  No mass or collection is seen. A small amount of   pneumocephalus is seen.    VISUALIZED SINUSES:  Clear.    VISUALIZED MASTOIDS:  Clear.    CALVARIUM:  Normal.    MISCELLANEOUS:  Subcutaneous emphysema is seen within the right scalp      IMPRESSION:    Unchanged ventricular size and configuration.    Right-sided intraventricular catheter in place.    < end of copied text >
Patient seen and examined. S/p  shunt POD #3. Called for ileus, nausea and emesis. Vitals are hemodynamically appropriate. He has complaints of pain. Denies nausea or emesis at this time. He just had a BM and flatus as well. On physical exam abdomen is soft nontender, nondistended, no rebound or guarding. Abdomen appears benign at this time with no erythema or drainage at the sites. Patient is to follow up with neurosurgical team as outpatient.
CHIEF COMPLAINT: patient much better today, +BM overnight    Vital Signs Last 24 Hrs  T(C): 36.6 (23 Jul 2018 10:23), Max: 36.7 (22 Jul 2018 12:21)  T(F): 97.8 (23 Jul 2018 10:23), Max: 98.1 (23 Jul 2018 00:20)  HR: 55 (23 Jul 2018 10:23) (55 - 76)  BP: 138/78 (23 Jul 2018 10:23) (101/61 - 138/78)  BP(mean): --  RR: 16 (23 Jul 2018 10:23) (16 - 18)  SpO2: 95% (23 Jul 2018 10:23) (94% - 97%)    PHYSICAL EXAM:    General: No Acute Distress     Neurological: Awake, alert oriented to person, place and time, Following Commands, PERRL, EOMI, Face Symmetrical, Speech Fluent, Moving all extremities, Muscle Strength normal in all four extremities, No Drift, Sensation to Light Touch Intact    Pulmonary: Clear to Auscultation, No Rales, No Rhonchi, No Wheezes     Cardiovascular: S1, S2, Regular Rate and Rhythm     Gastrointestinal: Soft, Nontender, Nondistended, + BS     Incision: +staples c/d/i to head, + steri strips to abdomen    LABS:                        13.6   8.9   )-----------( 184      ( 21 Jul 2018 12:17 )             41.2    07-21    139  |  105  |  16  ----------------------------<  190<H>  4.4   |  24  |  0.84    Ca    8.7      21 Jul 2018 12:17      Hemoglobin A1C, Whole Blood: 7.0 % (07-11 @ 19:32)      MEDICATIONS:  Anticoagulation:  enoxaparin Injectable 40 milliGRAM(s) SubCutaneous at bedtime    Endo:  atorvastatin 20 milliGRAM(s) Oral at bedtime  dextrose 40% Gel 15 Gram(s) Oral once PRN  dextrose 50% Injectable 12.5 Gram(s) IV Push once  dextrose 50% Injectable 25 Gram(s) IV Push once  dextrose 50% Injectable 25 Gram(s) IV Push once  glucagon  Injectable 1 milliGRAM(s) IntraMuscular once PRN  insulin lispro (HumaLOG) corrective regimen sliding scale   SubCutaneous three times a day before meals  insulin lispro (HumaLOG) corrective regimen sliding scale   SubCutaneous at bedtime    Neuro:  acetaminophen   Tablet 650 milliGRAM(s) Oral every 6 hours PRN For Temp greater than 38 C (100.4 F)  ondansetron Injectable 4 milliGRAM(s) IV Push every 6 hours PRN Nausea and/or Vomiting  traMADol 50 milliGRAM(s) Oral every 6 hours PRN Severe Pain (7 - 10)    Cardiac:  propranolol 10 milliGRAM(s) Oral two times a day  tamsulosin 0.4 milliGRAM(s) Oral at bedtime    GI/:  docusate sodium 100 milliGRAM(s) Oral three times a day  pantoprazole    Tablet 40 milliGRAM(s) Oral daily    Other:   dextrose 5%. 1000 milliLiter(s) IV Continuous <Continuous>  sodium chloride 0.9% with potassium chloride 20 mEq/L 1000 milliLiter(s) IV Continuous <Continuous>    DIET: [] Regular [x] CCD [] Renal [] Puree [] Dysphagia [] Tube Feeds:     IMAGING:   < from: CT Head No Cont (07.22.18 @ 18:33) >  IMPRESSION:    Unchanged right frontal  ventriculoperitoneal shunt with tip in left   lateral ventricle, resolving right frontal subdural and pneumocephalus,   interval decrease in size of the lateral and third ventricles. Persistent   volume loss and microvascular disease, basal cisterns remain patent, no    midline shift.  < end of copied text >    < from: Xray Abdomen 2 Views (07.22.18 @ 19:47) >  IMPRESSION:   Nonobstructive bowel gas pattern without evidence of free air. Partially   imaged  shunt catheter with tip in the right lower quadrant. Moderate   to large stool burden.    < end of copied text >
POD 3:    S: Patient was doing well till yesterday when he started to feel some non-specific feeling of being "sick". Was better today and then developed the same sensation again. Had 1 moderate BM yesterday and a small one today. Mild diffuse abdominal pain. No chest pain. Says that he walked a bit and that the walking seemed better.  O: Lying in bed. Awake and alert. Abdomen slightly tender near incisions. Bowel sounds present, but slightly sluggish.  X: CT head shows ventricles to be definitely smaller than pre-op and initial CT post-op. Abdominal films show non-obstructive bowel loop distension. Stools+.  L: Unremarkable. Troponins neg  A: Probably has a mild ileus secondary to surgery and CSF in peritoneum.  P: NPO. Dulcolax suppository this evening. To remain in hospital till he feels better      Peter Weldon MD
POD# 4    S: Doing better today. Sitting up in chair. Had small BM this morning. Says that he has a BM only every 2-3 days even at baseline. Eating lunch. Decreased hearing AS (Baseline). Muffled hearing AD - likely from prep fluid.  O: Afebrile. Benign abdomen  A: Doing better  P: To be walked around. If he feels well enough, he could be sent home this evening.    Peter Weldon MD
[facility]   Pager [phone]  Office [phone]      CC: nausea       SUBJECTIVE / OVERNIGHT EVENTS: No further episodes of nausea/pallor/diaphoresis. Pt was found to be orthostatic. Wife reports very little oral intake since surgery and pt states that it is bec he has no appetite. No CP/SOB    MEDICATIONS  (STANDING):  atorvastatin 20 milliGRAM(s) Oral at bedtime  dextrose 5%. 1000 milliLiter(s) (50 mL/Hr) IV Continuous <Continuous>  dextrose 50% Injectable 12.5 Gram(s) IV Push once  dextrose 50% Injectable 25 Gram(s) IV Push once  dextrose 50% Injectable 25 Gram(s) IV Push once  docusate sodium 100 milliGRAM(s) Oral three times a day  enoxaparin Injectable 40 milliGRAM(s) SubCutaneous at bedtime  insulin lispro (HumaLOG) corrective regimen sliding scale   SubCutaneous three times a day before meals  insulin lispro (HumaLOG) corrective regimen sliding scale   SubCutaneous at bedtime  pantoprazole    Tablet 40 milliGRAM(s) Oral daily  polyethylene glycol 3350 17 Gram(s) Oral daily  propranolol 10 milliGRAM(s) Oral two times a day  sodium chloride 0.9% Bolus 500 milliLiter(s) IV Bolus once  tamsulosin 0.4 milliGRAM(s) Oral at bedtime    MEDICATIONS  (PRN):  acetaminophen   Tablet 650 milliGRAM(s) Oral every 6 hours PRN For Temp greater than 38 C (100.4 F)  bisacodyl Suppository 10 milliGRAM(s) Rectal daily PRN Constipation  dextrose 40% Gel 15 Gram(s) Oral once PRN Blood Glucose LESS THAN 70 milliGRAM(s)/deciliter  glucagon  Injectable 1 milliGRAM(s) IntraMuscular once PRN Glucose LESS THAN 70 milligrams/deciliter  ondansetron Injectable 4 milliGRAM(s) IV Push every 6 hours PRN Nausea and/or Vomiting      Vital Signs Last 24 Hrs  T(C): 36.7 (24 Jul 2018 12:36), Max: 36.8 (23 Jul 2018 15:44)  T(F): 98.1 (24 Jul 2018 12:36), Max: 98.2 (23 Jul 2018 15:44)  HR: 66 (24 Jul 2018 12:36) (55 - 84)  BP: 125/75 (24 Jul 2018 12:36) (111/74 - 137/70)  BP(mean): --  RR: 18 (24 Jul 2018 12:36) (18 - 18)  SpO2: 98% (24 Jul 2018 12:36) (93% - 98%)  CAPILLARY BLOOD GLUCOSE      POCT Blood Glucose.: 214 mg/dL (24 Jul 2018 12:24)  POCT Blood Glucose.: 154 mg/dL (24 Jul 2018 08:28)  POCT Blood Glucose.: 172 mg/dL (23 Jul 2018 21:23)  POCT Blood Glucose.: 138 mg/dL (23 Jul 2018 17:24)    I&O's Summary    23 Jul 2018 07:01  -  24 Jul 2018 07:00  --------------------------------------------------------  IN: 480 mL / OUT: 1376 mL / NET: -896 mL    24 Jul 2018 07:01  -  24 Jul 2018 12:51  --------------------------------------------------------  IN: 0 mL / OUT: 250 mL / NET: -250 mL      tele: sb/sr    PHYSICAL EXAM:    GENERAL: NAD   HEENT: EOMI, PERRL  PULM: Clear to auscultation bilaterally  CV: Regular rate and rhythm; nl S1, S2; No murmurs, rubs, or gallops  ABDOMEN: Soft, Nontender, Nondistended; Bowel sounds present  EXTREMITIES/MSK:  No edema, calf tenderness   PSYCH: AAOx3  NEUROLOGY: non-focal          LABS:                        13.7   7.2   )-----------( 179      ( 23 Jul 2018 12:19 )             41.2     07-23    141  |  101  |  15  ----------------------------<  202<H>  4.3   |  29  |  0.86    Ca    8.9      23 Jul 2018 12:19    TPro  7.2  /  Alb  3.9  /  TBili  0.5  /  DBili  x   /  AST  22  /  ALT  26  /  AlkPhos  55  07-23                RADIOLOGY & ADDITIONAL TESTS:    Imaging Personally Reviewed:    Consultant(s) Notes Reviewed:      Care Discussed with Consultants/Other Providers:
SUBJECTIVE:   Doing well. Headache and abdominal pain resolved with IV tylenol. tolerating diet. OOB to chair  OVERNIGHT EVENTS: none    Vital Signs Last 24 Hrs  T(C): 36.3 (2018 08:54), Max: 36.7 (2018 00:07)  T(F): 97.4 (2018 08:54), Max: 98 (2018 00:07)  HR: 58 (2018 08:54) (58 - 75)  BP: 123/75 (2018 08:54) (101/61 - 136/65)  BP(mean): 83 (2018 20:00) (83 - 93)  RR: 18 (2018 08:54) (12 - 18)  SpO2: 97% (2018 08:54) (95% - 100%)    PHYSICAL EXAM:    Constitutional: No Acute Distress     Neurological: AOx3, Following Commands, Moving all Extremities 5/5. No drift MARYBETH EOMI Cranial dressing C/D/I. Abdominal dressing C/D/i      Pulmonary: Clear to Auscultation, No rales, No rhonchi, No wheezes     Cardiovascular: S1, S2, Regular rate and rhythm     Gastrointestinal: Soft, Non-tender, Non-distended     Extremities: No calf tenderness       LABS:                 IMAGIN/20 CT head s/p R sided intraventricular shunt. Unchanged vent size.     MEDICATIONS:    ondansetron Injectable 4 milliGRAM(s) IV Push every 6 hours PRN Nausea and/or Vomiting  traMADol 50 milliGRAM(s) Oral every 6 hours PRN Severe Pain (7 - 10)  propranolol 10 milliGRAM(s) Oral two times a day  tamsulosin 0.4 milliGRAM(s) Oral at bedtime  docusate sodium 100 milliGRAM(s) Oral three times a day  pantoprazole    Tablet 40 milliGRAM(s) Oral daily  atorvastatin 20 milliGRAM(s) Oral at bedtime  insulin lispro (HumaLOG) corrective regimen sliding scale   SubCutaneous three times a day before meals  insulin lispro (HumaLOG) corrective regimen sliding scale   SubCutaneous at bedtime  sodium chloride 0.9% with potassium chloride 20 mEq/L 1000 milliLiter(s) IV Continuous <Continuous>      DIET:   consistent carb diet
SUBJECTIVE:   States R ear hearing muffled . Denies nausea and dizziness  OVERNIGHT EVENTS: none    Vital Signs Last 24 Hrs  T(C): 36.7 (24 Jul 2018 12:36), Max: 36.8 (23 Jul 2018 15:44)  T(F): 98.1 (24 Jul 2018 12:36), Max: 98.2 (23 Jul 2018 15:44)  HR: 66 (24 Jul 2018 12:36) (55 - 84)  BP: 125/75 (24 Jul 2018 12:36) (111/74 - 137/70)  RR: 18 (24 Jul 2018 12:36) (18 - 18)  SpO2: 98% (24 Jul 2018 12:36) (93% - 98%)    PHYSICAL EXAM:    Neurological: AOx3, Following Commands, Moving all Extremities 5/5. No drift MARYBETH EOMI Cranial staples C/D/I. Abdominal steristrips C/D/I    Pulmonary: Clear to Auscultation,    Cardiovascular: S1, S2, Regular rate and rhythm     Gastrointestinal: Soft, Non-tender, Non-distended + BS    Extremities: No calf tenderness       LABS:                        13.7   7.2   )-----------( 179      ( 23 Jul 2018 12:19 )             41.2    07-23    141  |  101  |  15  ----------------------------<  202<H>  4.3   |  29  |  0.86    Ca    8.9      23 Jul 2018 12:19    TPro  7.2  /  Alb  3.9  /  TBili  0.5  /  DBili  x   /  AST  22  /  ALT  26  /  AlkPhos  55  07-23        IMAGING:         MEDICATIONS:  Antibiotics:    Neuro:  acetaminophen   Tablet 650 milliGRAM(s) Oral every 6 hours PRN For Temp greater than 38 C (100.4 F)  propranolol 10 milliGRAM(s) Oral two times a day  tamsulosin 0.4 milliGRAM(s) Oral at bedtime  bisacodyl Suppository 10 milliGRAM(s) Rectal daily PRN Constipation  docusate sodium 100 milliGRAM(s) Oral three times a day  pantoprazole    Tablet 40 milliGRAM(s) Oral daily  polyethylene glycol 3350 17 Gram(s) Oral daily  atorvastatin 20 milliGRAM(s) Oral at bedtime  enoxaparin Injectable 40 milliGRAM(s) SubCutaneous at bedtime  glucagon  Injectable 1 milliGRAM(s) IntraMuscular once PRN Glucose LESS THAN 70 milligrams/deciliter  insulin lispro (HumaLOG) corrective regimen sliding scale   SubCutaneous three times a day before meals  insulin lispro (HumaLOG) corrective regimen sliding scale   SubCutaneous at bedtime  sodium chloride 0.9% Bolus 500 milliLiter(s) IV Bolus once      DIET:

## 2018-07-24 NOTE — PROGRESS NOTE ADULT - PROBLEM SELECTOR PLAN 1
little po intake. pt received 500 cc NS bolus yest but still orthostatic. thigh high compression stockings and leg exercises prior to standing shown to pt/wife.

## 2018-07-24 NOTE — PROGRESS NOTE ADULT - ASSESSMENT
67 y/o male with  PMH of HTN, HLD, DM Type 2 (unknown HgA1c), BPH . Pt has a history for gait abnormality and imbalance followed by psych and neuro s/p spinal tap in 6/22/2018 with significant improvement with gait and balance.  Presented to PST for scheduled Laparoscopic  Shunt Insertion on 7/20/2018. (11 Jul 2018 14:00)    PAST MEDICAL & SURGICAL HISTORY:  Abdominal pain: s/p colonoscopy -2018  PARMINDER on CPAP: dx 7 years ago ,never used CPAP , scheduled for home sleep study on 7/12/2018  Hydrocephalus  Ptosis of eyelid, bilateral  Organic writer's cramp  DM type 2 (diabetes mellitus, type 2)  BPH (benign prostatic hyperplasia)  Hypercholesterolemia  HTN (hypertension)  Varicose veins: h/o varicose vein surgery- 4 years ago  H/O colonoscopy with polypectomy: 2018    PROCEDURE: 7/20/18 s/p  shunt placement for NPH (certas 6)    PLAN:  Neuro: cont tylenol and ultram for pain PRN  Respiratory: patient instructed on incentive spirometer  CV: cont home med propranolol for HTN, cont tamulosin for BPH  Endocrine: cont HISS and restart metformin on d/c for type 2 DM       DVT ppx: [] SQH [x] SQL and venodynes bilaterally  Renal: IVL  GI: bowel regimen   PT/OT: home PT and rolling walker  Will plan to d/c home today, d/c IVL prior to d/c home.  Discussed with patient and family wound care, follow up plans, activity, and medications. Questions answered, and they verbalized understanding.   RX sent to [facility]    Will discuss with Dr Shiraz Baird # [id number]
69 y/o male with  PMH of HTN, HLD, DM Type 2 (unknown HgA1c), BPH . Pt has a history for gait abnormality and imbalance followed by psych and neuro s/p spinal tap in 6/22/2018 with significant improvement with gait and balance.  Presented to PST for scheduled Laparoscopic  Shunt Insertion on 7/20/2018. (11 Jul 2018 14:00)    PAST MEDICAL & SURGICAL HISTORY:  Abdominal pain: s/p colonoscopy -2018  PARMINDER on CPAP: dx 7 years ago ,never used CPAP , scheduled for home sleep study on 7/12/2018  Hydrocephalus  Ptosis of eyelid, bilateral  Organic writer's cramp  DM type 2 (diabetes mellitus, type 2)  BPH (benign prostatic hyperplasia)  Hypercholesterolemia  HTN (hypertension)  Varicose veins: h/o varicose vein surgery- 4 years ago  H/O colonoscopy with polypectomy: 2018    PROCEDURE: 7/20/18 s/p  shunt placement for NPH (certas 6)    PLAN:  Neuro: cont tylenol and ultram for pain PRN  Respiratory: patient instructed on incentive spirometer  CV: cont home med propranolol for HTN, cont tamulosin for BPH  Endocrine: cont HISS and restart metformin on d/c for type 2 DM       DVT ppx: [] SQH [x] SQL and venodynes bilaterally  Renal: IVL  GI: bowel regimen   PT/OT: home PT and rolling walker  Will plan to d/c home today, d/c IVL prior to d/c home. D/C held yesterday after persisting nausea/vomiting.   Appreciate surgery follow up.   Discussed with patient and family wound care, follow up plans, activity, and medications. Questions answered, and they verbalized understanding.   RX sent to [facility]    Will discuss with Dr Shiraz Baird # [id number]
69 y/o male with  PMH of HTN, HLD, DM Type 2 (unknown HgA1c), BPH . Pt has a history for gait abnormality and imbalance followed by psych and neuro s/p spinal tap in 6/22/2018 with significant improvement with gait and balance.  Presents to PST for scheduled Laparoscopic  Shunt Insertion on 7/20/2018. s/p right vps. certas at 6 pod #1     Plan  neuro stable- d/c keppra  Vitals stable   Essential tremors- on Propranalol  Type 2 DM-change to CCD diet. Humalog sliding scale   CBC BMP stat  PT- pending
69 y/o male with  PMH of HTN, HLD, DM Type 2 (unknown HgA1c), BPH . Pt has a history for gait abnormality and imbalance followed by psych and neuro s/p spinal tap in 6/22/2018 with significant improvement with gait and balance.  Presents to PST for scheduled Laparoscopic  Shunt Insertion on 7/20/2018. s/p right vps. certas at 6 pod #4 hospital course significant for episodes of nausea and pallor and mild orthostatic hypotension    Plan:  Neuro stable . Rpt CT head 7/22 stable  Nausea- EKG / Troponins negative   Orthostatic- Likely deconditioning  Encouraged slow change of position/ OOB/ambulation with waleska stockings. NS 500cc bolus given. Ambulated >200 ft with RW without difficulty. Post ambulation vitals stable. To consider stopping flomax if orthostasis persists. D/W wife ( retired nurse). medicine f/u appreciated   PT- Home PT/RW  D/c home today. Seen with Dr Weldon at bedside
69 yo M h/o DM2, PARMINDER, NPH, HLD s/p  shunt for hydrocephalus with recurrent episodes of nausea and pallor in settings of attempted BMs or passing flatus.

## 2018-07-25 PROBLEM — R10.9 UNSPECIFIED ABDOMINAL PAIN: Chronic | Status: ACTIVE | Noted: 2018-07-11

## 2018-07-25 PROBLEM — H02.403 UNSPECIFIED PTOSIS OF BILATERAL EYELIDS: Chronic | Status: ACTIVE | Noted: 2018-07-11

## 2018-07-25 PROBLEM — N40.0 BENIGN PROSTATIC HYPERPLASIA WITHOUT LOWER URINARY TRACT SYMPTOMS: Chronic | Status: ACTIVE | Noted: 2018-07-11

## 2018-07-25 PROBLEM — E11.9 TYPE 2 DIABETES MELLITUS WITHOUT COMPLICATIONS: Chronic | Status: ACTIVE | Noted: 2018-07-11

## 2018-07-31 ENCOUNTER — APPOINTMENT (OUTPATIENT)
Dept: NEUROSURGERY | Facility: CLINIC | Age: 68
End: 2018-07-31
Payer: MEDICARE

## 2018-07-31 VITALS
RESPIRATION RATE: 18 BRPM | HEART RATE: 77 BPM | DIASTOLIC BLOOD PRESSURE: 79 MMHG | TEMPERATURE: 97.8 F | SYSTOLIC BLOOD PRESSURE: 125 MMHG

## 2018-07-31 PROCEDURE — 99024 POSTOP FOLLOW-UP VISIT: CPT

## 2018-07-31 PROCEDURE — 62252 CSF SHUNT REPROGRAM: CPT

## 2018-08-14 ENCOUNTER — APPOINTMENT (OUTPATIENT)
Dept: PULMONOLOGY | Facility: CLINIC | Age: 68
End: 2018-08-14
Payer: MEDICARE

## 2018-08-14 VITALS
RESPIRATION RATE: 16 BRPM | SYSTOLIC BLOOD PRESSURE: 111 MMHG | HEART RATE: 94 BPM | OXYGEN SATURATION: 97 % | DIASTOLIC BLOOD PRESSURE: 76 MMHG

## 2018-08-14 PROCEDURE — 99213 OFFICE O/P EST LOW 20 MIN: CPT

## 2018-09-04 ENCOUNTER — OUTPATIENT (OUTPATIENT)
Dept: OUTPATIENT SERVICES | Facility: HOSPITAL | Age: 68
LOS: 1 days | End: 2018-09-04
Payer: COMMERCIAL

## 2018-09-04 VITALS
TEMPERATURE: 98 F | HEIGHT: 65 IN | HEART RATE: 80 BPM | DIASTOLIC BLOOD PRESSURE: 70 MMHG | RESPIRATION RATE: 18 BRPM | OXYGEN SATURATION: 99 % | WEIGHT: 141.98 LBS | SYSTOLIC BLOOD PRESSURE: 102 MMHG

## 2018-09-04 DIAGNOSIS — Z01.818 ENCOUNTER FOR OTHER PREPROCEDURAL EXAMINATION: ICD-10-CM

## 2018-09-04 DIAGNOSIS — H02.423 MYOGENIC PTOSIS OF BILATERAL EYELIDS: ICD-10-CM

## 2018-09-04 DIAGNOSIS — E11.9 TYPE 2 DIABETES MELLITUS WITHOUT COMPLICATIONS: ICD-10-CM

## 2018-09-04 DIAGNOSIS — G47.33 OBSTRUCTIVE SLEEP APNEA (ADULT) (PEDIATRIC): ICD-10-CM

## 2018-09-04 DIAGNOSIS — Z98.890 OTHER SPECIFIED POSTPROCEDURAL STATES: Chronic | ICD-10-CM

## 2018-09-04 DIAGNOSIS — I83.90 ASYMPTOMATIC VARICOSE VEINS OF UNSPECIFIED LOWER EXTREMITY: Chronic | ICD-10-CM

## 2018-09-04 DIAGNOSIS — Z91.040 LATEX ALLERGY STATUS: ICD-10-CM

## 2018-09-04 LAB
ANION GAP SERPL CALC-SCNC: 15 MMOL/L — SIGNIFICANT CHANGE UP (ref 5–17)
BUN SERPL-MCNC: 18 MG/DL — SIGNIFICANT CHANGE UP (ref 7–23)
CALCIUM SERPL-MCNC: 9.6 MG/DL — SIGNIFICANT CHANGE UP (ref 8.4–10.5)
CHLORIDE SERPL-SCNC: 102 MMOL/L — SIGNIFICANT CHANGE UP (ref 96–108)
CO2 SERPL-SCNC: 23 MMOL/L — SIGNIFICANT CHANGE UP (ref 22–31)
CREAT SERPL-MCNC: 1.04 MG/DL — SIGNIFICANT CHANGE UP (ref 0.5–1.3)
GLUCOSE SERPL-MCNC: 115 MG/DL — HIGH (ref 70–99)
HBA1C BLD-MCNC: 7 % — HIGH (ref 4–5.6)
HCT VFR BLD CALC: 41.2 % — SIGNIFICANT CHANGE UP (ref 39–50)
HGB BLD-MCNC: 13.5 G/DL — SIGNIFICANT CHANGE UP (ref 13–17)
MCHC RBC-ENTMCNC: 27.3 PG — SIGNIFICANT CHANGE UP (ref 27–34)
MCHC RBC-ENTMCNC: 32.8 GM/DL — SIGNIFICANT CHANGE UP (ref 32–36)
MCV RBC AUTO: 83.4 FL — SIGNIFICANT CHANGE UP (ref 80–100)
PLATELET # BLD AUTO: 225 K/UL — SIGNIFICANT CHANGE UP (ref 150–400)
POTASSIUM SERPL-MCNC: 4.8 MMOL/L — SIGNIFICANT CHANGE UP (ref 3.5–5.3)
POTASSIUM SERPL-SCNC: 4.8 MMOL/L — SIGNIFICANT CHANGE UP (ref 3.5–5.3)
RBC # BLD: 4.94 M/UL — SIGNIFICANT CHANGE UP (ref 4.2–5.8)
RBC # FLD: 13.6 % — SIGNIFICANT CHANGE UP (ref 10.3–14.5)
SODIUM SERPL-SCNC: 140 MMOL/L — SIGNIFICANT CHANGE UP (ref 135–145)
WBC # BLD: 5.22 K/UL — SIGNIFICANT CHANGE UP (ref 3.8–10.5)
WBC # FLD AUTO: 5.22 K/UL — SIGNIFICANT CHANGE UP (ref 3.8–10.5)

## 2018-09-04 PROCEDURE — 83036 HEMOGLOBIN GLYCOSYLATED A1C: CPT

## 2018-09-04 PROCEDURE — 80048 BASIC METABOLIC PNL TOTAL CA: CPT

## 2018-09-04 PROCEDURE — G0463: CPT

## 2018-09-04 PROCEDURE — 85027 COMPLETE CBC AUTOMATED: CPT

## 2018-09-04 RX ORDER — PROPRANOLOL HCL 160 MG
1 CAPSULE, EXTENDED RELEASE 24HR ORAL
Qty: 0 | Refills: 0 | COMMUNITY

## 2018-09-04 RX ORDER — LIDOCAINE HCL 20 MG/ML
0.2 VIAL (ML) INJECTION ONCE
Qty: 0 | Refills: 0 | Status: DISCONTINUED | OUTPATIENT
Start: 2018-09-11 | End: 2018-09-26

## 2018-09-04 RX ORDER — SODIUM CHLORIDE 9 MG/ML
3 INJECTION INTRAMUSCULAR; INTRAVENOUS; SUBCUTANEOUS EVERY 8 HOURS
Qty: 0 | Refills: 0 | Status: DISCONTINUED | OUTPATIENT
Start: 2018-09-11 | End: 2018-09-26

## 2018-09-04 NOTE — H&P PST ADULT - ATTENDING COMMENTS
Risks including under and over correction, dry eye, change in vision among others reviewed.  All questions answered.  For bilateral upper eyelid ptosis surgery.

## 2018-09-04 NOTE — H&P PST ADULT - PSH
H/O colonoscopy with polypectomy  2018  S/P arthroscopy of right knee  2003  S/P cardiac catheterization  2013  Varicose veins  h/o varicose vein surgery- 4 years ago

## 2018-09-05 PROBLEM — G91.9 HYDROCEPHALUS, UNSPECIFIED: Chronic | Status: ACTIVE | Noted: 2018-07-11

## 2018-09-05 PROBLEM — G25.89 OTHER SPECIFIED EXTRAPYRAMIDAL AND MOVEMENT DISORDERS: Chronic | Status: INACTIVE | Noted: 2018-07-11 | Resolved: 2018-09-04

## 2018-09-05 PROBLEM — G47.33 OBSTRUCTIVE SLEEP APNEA (ADULT) (PEDIATRIC): Chronic | Status: INACTIVE | Noted: 2018-07-11 | Resolved: 2018-09-04

## 2018-09-10 ENCOUNTER — TRANSCRIPTION ENCOUNTER (OUTPATIENT)
Age: 68
End: 2018-09-10

## 2018-09-10 RX ORDER — CELECOXIB 200 MG/1
200 CAPSULE ORAL ONCE
Qty: 0 | Refills: 0 | Status: DISCONTINUED | OUTPATIENT
Start: 2018-09-11 | End: 2018-09-26

## 2018-09-10 RX ORDER — ONDANSETRON 8 MG/1
4 TABLET, FILM COATED ORAL ONCE
Qty: 0 | Refills: 0 | Status: DISCONTINUED | OUTPATIENT
Start: 2018-09-11 | End: 2018-09-26

## 2018-09-10 RX ORDER — SODIUM CHLORIDE 9 MG/ML
1000 INJECTION, SOLUTION INTRAVENOUS
Qty: 0 | Refills: 0 | Status: DISCONTINUED | OUTPATIENT
Start: 2018-09-11 | End: 2018-09-26

## 2018-09-11 ENCOUNTER — OUTPATIENT (OUTPATIENT)
Dept: OUTPATIENT SERVICES | Facility: HOSPITAL | Age: 68
LOS: 1 days | End: 2018-09-11
Payer: COMMERCIAL

## 2018-09-11 VITALS
SYSTOLIC BLOOD PRESSURE: 133 MMHG | OXYGEN SATURATION: 100 % | DIASTOLIC BLOOD PRESSURE: 70 MMHG | HEART RATE: 60 BPM | RESPIRATION RATE: 18 BRPM

## 2018-09-11 VITALS
TEMPERATURE: 98 F | OXYGEN SATURATION: 100 % | HEART RATE: 62 BPM | HEIGHT: 65 IN | WEIGHT: 141.98 LBS | RESPIRATION RATE: 16 BRPM | DIASTOLIC BLOOD PRESSURE: 70 MMHG | SYSTOLIC BLOOD PRESSURE: 116 MMHG

## 2018-09-11 DIAGNOSIS — Z98.890 OTHER SPECIFIED POSTPROCEDURAL STATES: Chronic | ICD-10-CM

## 2018-09-11 DIAGNOSIS — H02.423 MYOGENIC PTOSIS OF BILATERAL EYELIDS: ICD-10-CM

## 2018-09-11 DIAGNOSIS — I83.90 ASYMPTOMATIC VARICOSE VEINS OF UNSPECIFIED LOWER EXTREMITY: Chronic | ICD-10-CM

## 2018-09-11 LAB — GLUCOSE BLDC GLUCOMTR-MCNC: 112 MG/DL — HIGH (ref 70–99)

## 2018-09-11 PROCEDURE — 67908 REPAIR EYELID DEFECT: CPT | Mod: 50

## 2018-09-11 PROCEDURE — 82962 GLUCOSE BLOOD TEST: CPT

## 2018-09-11 RX ORDER — PROPRANOLOL HCL 160 MG
2 CAPSULE, EXTENDED RELEASE 24HR ORAL
Qty: 0 | Refills: 0 | COMMUNITY

## 2018-09-11 RX ORDER — TAMSULOSIN HYDROCHLORIDE 0.4 MG/1
1 CAPSULE ORAL
Qty: 0 | Refills: 0 | COMMUNITY

## 2018-09-11 RX ORDER — ACETAMINOPHEN 500 MG
1000 TABLET ORAL ONCE
Qty: 0 | Refills: 0 | Status: COMPLETED | OUTPATIENT
Start: 2018-09-11 | End: 2018-09-11

## 2018-09-11 RX ORDER — CELECOXIB 200 MG/1
200 CAPSULE ORAL ONCE
Qty: 0 | Refills: 0 | Status: COMPLETED | OUTPATIENT
Start: 2018-09-11 | End: 2018-09-11

## 2018-09-11 RX ORDER — METFORMIN HYDROCHLORIDE 850 MG/1
1 TABLET ORAL
Qty: 0 | Refills: 0 | COMMUNITY

## 2018-09-11 RX ORDER — ATORVASTATIN CALCIUM 80 MG/1
1 TABLET, FILM COATED ORAL
Qty: 0 | Refills: 0 | COMMUNITY

## 2018-09-11 RX ADMIN — Medication 1000 MILLIGRAM(S): at 13:17

## 2018-09-11 RX ADMIN — CELECOXIB 200 MILLIGRAM(S): 200 CAPSULE ORAL at 13:17

## 2018-09-11 NOTE — ASU DISCHARGE PLAN (ADULT/PEDIATRIC). - NOTIFY
Bleeding that does not stop/Fever greater than 101/Swelling that continues/Pain not relieved by Medications

## 2018-09-11 NOTE — ASU PATIENT PROFILE, ADULT - PMH
Abdominal pain  s/p colonoscopy -2018  BPH (benign prostatic hyperplasia)    DM type 2 (diabetes mellitus, type 2)    Essential tremor    Hydrocephalus  s/p  shunt  placed 7/2018  Hypercholesterolemia    PARMINDER (obstructive sleep apnea)    Ptosis of eyelid, bilateral

## 2018-10-23 ENCOUNTER — OUTPATIENT (OUTPATIENT)
Dept: OUTPATIENT SERVICES | Facility: HOSPITAL | Age: 68
LOS: 1 days | End: 2018-10-23
Payer: COMMERCIAL

## 2018-10-23 ENCOUNTER — APPOINTMENT (OUTPATIENT)
Dept: NEUROSURGERY | Facility: CLINIC | Age: 68
End: 2018-10-23
Payer: MEDICARE

## 2018-10-23 VITALS
BODY MASS INDEX: 22.49 KG/M2 | DIASTOLIC BLOOD PRESSURE: 74 MMHG | WEIGHT: 135 LBS | TEMPERATURE: 98.4 F | HEART RATE: 78 BPM | SYSTOLIC BLOOD PRESSURE: 115 MMHG | OXYGEN SATURATION: 97 % | RESPIRATION RATE: 16 BRPM | HEIGHT: 65 IN

## 2018-10-23 DIAGNOSIS — Z98.890 OTHER SPECIFIED POSTPROCEDURAL STATES: Chronic | ICD-10-CM

## 2018-10-23 DIAGNOSIS — I83.90 ASYMPTOMATIC VARICOSE VEINS OF UNSPECIFIED LOWER EXTREMITY: Chronic | ICD-10-CM

## 2018-10-23 DIAGNOSIS — G91.9 HYDROCEPHALUS, UNSPECIFIED: ICD-10-CM

## 2018-10-23 PROBLEM — G47.33 OBSTRUCTIVE SLEEP APNEA (ADULT) (PEDIATRIC): Chronic | Status: ACTIVE | Noted: 2018-09-04

## 2018-10-23 PROBLEM — G25.0 ESSENTIAL TREMOR: Chronic | Status: ACTIVE | Noted: 2018-09-04

## 2018-10-23 PROCEDURE — 70450 CT HEAD/BRAIN W/O DYE: CPT | Mod: 26

## 2018-10-23 PROCEDURE — 99214 OFFICE O/P EST MOD 30 MIN: CPT

## 2018-10-23 PROCEDURE — 70450 CT HEAD/BRAIN W/O DYE: CPT

## 2018-10-23 RX ORDER — TESTOSTERONE 50 MG/5G
50 MG/5GM GEL TRANSDERMAL
Qty: 150 | Refills: 0 | Status: DISCONTINUED | COMMUNITY
Start: 2018-01-09 | End: 2018-10-23

## 2018-10-23 RX ORDER — POLYETHYLENE GLYCOL 3350, SODIUM SULFATE, SODIUM CHLORIDE, POTASSIUM CHLORIDE, ASCORBIC ACID, SODIUM ASCORBATE 7.5-2.691G
100 KIT ORAL
Qty: 1 | Refills: 0 | Status: DISCONTINUED | COMMUNITY
Start: 2018-02-14 | End: 2018-10-23

## 2018-11-10 RX ORDER — ATORVASTATIN CALCIUM 20 MG/1
20 TABLET, FILM COATED ORAL
Refills: 0 | Status: ACTIVE | COMMUNITY

## 2018-11-13 ENCOUNTER — APPOINTMENT (OUTPATIENT)
Dept: PULMONOLOGY | Facility: CLINIC | Age: 68
End: 2018-11-13
Payer: MEDICARE

## 2018-11-13 VITALS
HEIGHT: 65 IN | OXYGEN SATURATION: 97 % | DIASTOLIC BLOOD PRESSURE: 73 MMHG | HEART RATE: 72 BPM | TEMPERATURE: 97.9 F | SYSTOLIC BLOOD PRESSURE: 108 MMHG | WEIGHT: 135 LBS | RESPIRATION RATE: 14 BRPM | BODY MASS INDEX: 22.49 KG/M2

## 2018-11-13 PROCEDURE — 99213 OFFICE O/P EST LOW 20 MIN: CPT

## 2019-01-29 ENCOUNTER — OUTPATIENT (OUTPATIENT)
Dept: OUTPATIENT SERVICES | Facility: HOSPITAL | Age: 69
LOS: 1 days | End: 2019-01-29
Payer: MEDICARE

## 2019-01-29 ENCOUNTER — APPOINTMENT (OUTPATIENT)
Dept: NEUROSURGERY | Facility: CLINIC | Age: 69
End: 2019-01-29
Payer: MEDICARE

## 2019-01-29 VITALS
TEMPERATURE: 97.7 F | HEART RATE: 74 BPM | WEIGHT: 145 LBS | OXYGEN SATURATION: 98 % | SYSTOLIC BLOOD PRESSURE: 118 MMHG | BODY MASS INDEX: 24.16 KG/M2 | DIASTOLIC BLOOD PRESSURE: 79 MMHG | HEIGHT: 65 IN | RESPIRATION RATE: 15 BRPM

## 2019-01-29 DIAGNOSIS — Z98.890 OTHER SPECIFIED POSTPROCEDURAL STATES: Chronic | ICD-10-CM

## 2019-01-29 DIAGNOSIS — I83.90 ASYMPTOMATIC VARICOSE VEINS OF UNSPECIFIED LOWER EXTREMITY: Chronic | ICD-10-CM

## 2019-01-29 DIAGNOSIS — G91.9 HYDROCEPHALUS, UNSPECIFIED: ICD-10-CM

## 2019-01-29 PROCEDURE — 99214 OFFICE O/P EST MOD 30 MIN: CPT | Mod: 25

## 2019-01-29 PROCEDURE — 62252 CSF SHUNT REPROGRAM: CPT

## 2019-01-29 PROCEDURE — 70450 CT HEAD/BRAIN W/O DYE: CPT | Mod: 26

## 2019-01-29 PROCEDURE — 70450 CT HEAD/BRAIN W/O DYE: CPT

## 2019-01-29 RX ORDER — PROPRANOLOL HYDROCHLORIDE 20 MG/1
20 TABLET ORAL TWICE DAILY
Refills: 0 | Status: ACTIVE | COMMUNITY

## 2019-01-29 NOTE — HISTORY OF PRESENT ILLNESS
[FreeTextEntry1] : JOSELO MADDOX is a pleasant 68 year old male here on 10/23/2018, 3 months after he was last seen and 6 months after undergoing a right ventriculoperitoneal shunt with a Codman Certas valve set at 6 and a laparoscopic insertion of the abdominal end on 7/20/2018.  \par \par He comes to the office today reporting that the shunt did help, but hasn't helped as much as he would have hoped.  He feels his walking is not that much better. Denies memory issues, headache, dizziness, seizure (never), confusion, visual/smell/taste/hearing disturbance, or imbalance.  He is forgetful.  He doesn't walk as quickly as he once did. He went to [facility] last month and would walk every morning, but walked slowly. \par \par He takes Propranolol 20 mg every morning. BP today is 118/79. Says that he needs the medication, without which he does not feel right. \par \par TUG 11 secs (same as pre-op).\par \par \par

## 2019-01-29 NOTE — ASSESSMENT
[FreeTextEntry1] : IMPRESSION:\par \par 6 MONTHS S/P RIGHT  SHUNT. CODMAN CERTAS SET AT 6\par MODEST IMPROVEMENT\par LAST CT 3 MONTHS AGO SHOWED VENTRICLES TO HAVE GONE DOWN IN SIZE\par \par PLAN:\par Discussed reducing the valve setting to 5. I am concerned because the ventricles had already come down to normal size at 6. He agreed to leave it be at 6. Will repeat CT head today. RTC in 6 months unless CT shows something unexpected.\par \par Peter Weldon MD, FACS, FAANS\par Professor and \par Department of Neurosurgery\par [facility]\par [address], [address]\[place] [address]\par [phone] Clinical\par [phone] Academic\par \par \par

## 2019-01-29 NOTE — PHYSICAL EXAM
[General Appearance - Alert] : alert [General Appearance - In No Acute Distress] : in no acute distress [General Appearance - Well Nourished] : well nourished [General Appearance - Well Developed] : well developed [General Appearance - Well-Appearing] : healthy appearing [Oriented To Time, Place, And Person] : oriented to person, place, and time [Impaired Insight] : insight and judgment were intact [Affect] : the affect was normal [Mood] : the mood was normal [Memory Recent] : recent memory was not impaired [Memory Remote] : remote memory was not impaired [Person] : oriented to person [Place] : oriented to place [Time] : oriented to time [Sensation Tactile Decrease] : light touch was intact [Cranial Nerves Optic (II)] : visual acuity intact bilaterally,  pupils equal round and reactive to light [Cranial Nerves Oculomotor (III)] : extraocular motion intact [Cranial Nerves Trigeminal (V)] : facial sensation intact symmetrically [Cranial Nerves Facial (VII)] : face symmetrical [Cranial Nerves Vestibulocochlear (VIII)] : hearing was intact bilaterally [Cranial Nerves Glossopharyngeal (IX)] : tongue and palate midline [Cranial Nerves Accessory (XI - Cranial And Spinal)] : head turning and shoulder shrug symmetric [Cranial Nerves Hypoglossal (XII)] : there was no tongue deviation with protrusion [Motor Strength] : muscle strength was normal in all four extremities [Involuntary Movements] : no involuntary movements were seen [No Muscle Atrophy] : normal bulk in all four extremities [Motor Handedness Left-Handed] : the patient is left hand dominant [No Visual Abnormalities] : no visible abnormailities [PERRL With Normal Accommodation] : pupils were equal in size, round, reactive to light, with normal accommodation [Extraocular Movements] : extraocular movements were intact [No APD] : no afferent pupillary defect [Full Visual Field] : full visual field [Outer Ear] : the ears and nose were normal in appearance [Neck Appearance] : the appearance of the neck was normal [] : no respiratory distress [Respiration, Rhythm And Depth] : normal respiratory rhythm and effort [Exaggerated Use Of Accessory Muscles For Inspiration] : no accessory muscle use [Bowel Sounds] : normal bowel sounds [Abdomen Soft] : soft [Abdomen Tenderness] : non-tender [Nail Clubbing] : no clubbing  or cyanosis of the fingernails [Musculoskeletal - Swelling] : no joint swelling seen [Motor Tone] : muscle strength and tone were normal [Well-Healed] : well-healed [5] : L3 quadriceps 5/5 [Skin Color & Pigmentation] : normal skin color and pigmentation [Sensation Pain / Temperature Decrease] : pain and temperature was intact [Romberg's Sign] : Romberg's sign was negtive [Limited Balance] : balance was intact [Past-pointing] : there was no past-pointing [Tremor] : no tremor present [Dysdiadochokinesia Bilaterally] : not present [Coordination - Dysmetria Impaired Finger-to-Nose Bilateral] : not present [FreeTextEntry5] : [place] on the left more than the right [FreeTextEntry6] : 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 [FreeTextEntry8] : Cannot tandem walk. Otherwise walks independently. [FreeTextEntry1] : Gait is broad based; unable to tandem walk; 2 steps of turn around

## 2019-01-29 NOTE — REVIEW OF SYSTEMS
[Memory Lapses or Loss] : memory loss [Dizziness] : dizziness [Lightheadedness] : lightheadedness [Difficulty Walking] : difficulty walking [Loss Of Hearing] : hearing loss [Negative] : Heme/Lymph [Seizures] : no convulsions [Tension Headache] : no tension-type headache [Frequent Falls] : not falling

## 2019-03-12 ENCOUNTER — OUTPATIENT (OUTPATIENT)
Dept: OUTPATIENT SERVICES | Facility: HOSPITAL | Age: 69
LOS: 1 days | Discharge: HOME | End: 2019-03-12

## 2019-03-12 ENCOUNTER — APPOINTMENT (OUTPATIENT)
Dept: SPEECH THERAPY | Facility: CLINIC | Age: 69
End: 2019-03-12

## 2019-03-12 DIAGNOSIS — Z98.890 OTHER SPECIFIED POSTPROCEDURAL STATES: Chronic | ICD-10-CM

## 2019-03-12 DIAGNOSIS — I83.90 ASYMPTOMATIC VARICOSE VEINS OF UNSPECIFIED LOWER EXTREMITY: Chronic | ICD-10-CM

## 2019-03-13 DIAGNOSIS — H90.A31 MIXED CONDUCTIVE AND SENSORINEURAL HEARING LOSS, UNILATERAL, RIGHT EAR WITH RESTRICTED HEARING ON THE CONTRALATERAL SIDE: ICD-10-CM

## 2019-03-13 DIAGNOSIS — H90.A21 SENSORINEURAL HEARING LOSS, UNILATERAL, RIGHT EAR, WITH RESTRICTED HEARING ON THE CONTRALATERAL SIDE: ICD-10-CM

## 2019-03-14 ENCOUNTER — APPOINTMENT (OUTPATIENT)
Dept: UROLOGY | Facility: CLINIC | Age: 69
End: 2019-03-14
Payer: MEDICARE

## 2019-03-14 VITALS
SYSTOLIC BLOOD PRESSURE: 106 MMHG | DIASTOLIC BLOOD PRESSURE: 72 MMHG | HEART RATE: 78 BPM | RESPIRATION RATE: 16 BRPM | BODY MASS INDEX: 23.82 KG/M2 | HEIGHT: 65 IN | TEMPERATURE: 98.1 F | WEIGHT: 143 LBS

## 2019-03-14 DIAGNOSIS — N52.9 MALE ERECTILE DYSFUNCTION, UNSPECIFIED: ICD-10-CM

## 2019-03-14 PROCEDURE — 99214 OFFICE O/P EST MOD 30 MIN: CPT

## 2019-03-17 LAB
PSA FREE FLD-MCNC: 30 %
PSA FREE SERPL-MCNC: 0.27 NG/ML
PSA SERPL-MCNC: 0.89 NG/ML

## 2019-03-17 NOTE — HISTORY OF PRESENT ILLNESS
[FreeTextEntry1] : \par Gary Rangel returns to the office today. He is a prior patient of Dr. Narayanan and was last here in the office in January 2018. He was seen here in regard to prostate cancer screening and also for erectile dysfunction. The patient has had several years erectile dysfunction. He was also noted to have low libido and was found to have a borderline low testosterone level at 309 ng/dL in 2016. Apparently testosterone replacement therapy was discussed at a prior time with the patient which she had declined. He had also been offered PDE 5 inhibitors for management of the erectile dysfunction which he also declined.\par \par The patient denies prior PSA elevation or prostate biopsy. His last PSA on record from this office was July 2016 was 1.04 ng/mL. He has not had any noted abnormalities on prostate examination. He does have some history of lower urinary tract symptoms and had been on tamsulosin in the past. The patient reported that it caused him to have near syncope on 2 separate occasions and he has continued to medication despite this. However, there was other potential cause found on workup in July of last year when he was found to have hydrocephalus and underwent a  shunt. He does have periodic orthostasis. He also uses propranolol for a benign tremor. His current urinary symptoms are fairly mild. He reports a weak urinary stream and very mild frequency complaints. He has nocturia x1. He notes that without tamsulosin he has more significant nocturia. The patient denies hesitancy or sensation of incomplete bladder emptying. He denies abdominal or suprapubic pain. There is no flank pain, fever, chills, nausea or vomiting. His appetite is unchanged and there has been no unintentional weight loss. The patient denies chest pain or shortness of breath.\par \par \par

## 2019-03-17 NOTE — PHYSICAL EXAM
[General Appearance - Well Developed] : well developed [General Appearance - Well Nourished] : well nourished [Normal Appearance] : normal appearance [Well Groomed] : well groomed [Abdomen Soft] : soft [General Appearance - In No Acute Distress] : no acute distress [Abdomen Tenderness] : non-tender [Costovertebral Angle Tenderness] : no ~M costovertebral angle tenderness [Urethral Meatus] : meatus normal [Urinary Bladder Findings] : the bladder was normal on palpation [Scrotum] : the scrotum was normal [Testes Mass (___cm)] : there were no testicular masses [No Prostate Nodules] : no prostate nodules [] : no respiratory distress [Edema] : no peripheral edema [Respiration, Rhythm And Depth] : normal respiratory rhythm and effort [Exaggerated Use Of Accessory Muscles For Inspiration] : no accessory muscle use [Oriented To Time, Place, And Person] : oriented to person, place, and time [Affect] : the affect was normal [Mood] : the mood was normal [Not Anxious] : not anxious [Normal Station and Gait] : the gait and station were normal for the patient's age [No Focal Deficits] : no focal deficits [No Palpable Adenopathy] : no palpable adenopathy

## 2019-03-17 NOTE — ASSESSMENT
[FreeTextEntry1] : Prostate examination performed today in the office shows a smooth symmetrical prostate without any evidence of focal nodularity or induration to suggest presence of malignancy. The PSA was also reevaluated today and found to be 0.89 ng/mL. This very low level at the age of 68 he is quite reassuring and suggests a very low risk of him harboring clinically significant prostate cancer. There is no indication to consider prostate biopsy or imaging at this time.\par \par We discussed management options for his urinary symptoms and he would like to continue with the tamsulosin as he is currently using. Although he has had orthostasis in the past, this is potentially related to his prior finding of hydrocephalus and she has not had any recent events. We discussed alternative medications to use but he prefers to continue with what he is currently using.\par \par He does not wish to consider the use of any medication at this time for the erectile dysfunction but will admit note he changes his mind on this.

## 2019-04-09 ENCOUNTER — APPOINTMENT (OUTPATIENT)
Dept: PULMONOLOGY | Facility: CLINIC | Age: 69
End: 2019-04-09
Payer: MEDICARE

## 2019-04-09 PROCEDURE — 95800 SLP STDY UNATTENDED: CPT

## 2019-04-11 PROCEDURE — 95800 SLP STDY UNATTENDED: CPT

## 2019-04-14 ENCOUNTER — FORM ENCOUNTER (OUTPATIENT)
Age: 69
End: 2019-04-14

## 2019-04-23 ENCOUNTER — APPOINTMENT (OUTPATIENT)
Dept: ENDOCRINOLOGY | Facility: CLINIC | Age: 69
End: 2019-04-23
Payer: MEDICARE

## 2019-04-23 VITALS
HEIGHT: 65 IN | SYSTOLIC BLOOD PRESSURE: 100 MMHG | OXYGEN SATURATION: 97 % | HEART RATE: 67 BPM | WEIGHT: 315 LBS | BODY MASS INDEX: 52.48 KG/M2 | DIASTOLIC BLOOD PRESSURE: 60 MMHG

## 2019-04-23 DIAGNOSIS — J30.2 OTHER SEASONAL ALLERGIC RHINITIS: ICD-10-CM

## 2019-04-23 DIAGNOSIS — Z82.3 FAMILY HISTORY OF STROKE: ICD-10-CM

## 2019-04-23 LAB — HBA1C MFR BLD HPLC: 7.2

## 2019-04-23 PROCEDURE — 99204 OFFICE O/P NEW MOD 45 MIN: CPT | Mod: 25

## 2019-04-23 PROCEDURE — 83036 HEMOGLOBIN GLYCOSYLATED A1C: CPT | Mod: QW

## 2019-04-23 PROCEDURE — 36415 COLL VENOUS BLD VENIPUNCTURE: CPT

## 2019-04-23 PROCEDURE — 82962 GLUCOSE BLOOD TEST: CPT

## 2019-04-23 RX ORDER — TAMSULOSIN HYDROCHLORIDE 0.4 MG/1
0.4 CAPSULE ORAL
Refills: 0 | Status: ACTIVE | COMMUNITY
Start: 2019-04-23

## 2019-04-23 RX ORDER — METFORMIN ER 500 MG 500 MG/1
500 TABLET ORAL
Qty: 120 | Refills: 4 | Status: ACTIVE | COMMUNITY
Start: 2019-04-23 | End: 1900-01-01

## 2019-04-23 RX ORDER — ADHESIVE TAPE 3"X 2.3 YD
50 MCG TAPE, NON-MEDICATED TOPICAL
Qty: 90 | Refills: 0 | Status: ACTIVE | COMMUNITY
Start: 2019-04-23

## 2019-04-23 RX ORDER — METFORMIN HYDROCHLORIDE 1000 MG/1
1000 TABLET, COATED ORAL
Refills: 0 | Status: DISCONTINUED | COMMUNITY
End: 2019-04-23

## 2019-04-23 NOTE — HISTORY OF PRESENT ILLNESS
[FreeTextEntry1] : 69 yo M with PMH DM2, HLD, NPH post  shunt, PARMINDER \par \par he was diagnosed with DM2 in 2014\par Microvascular complications: denies diabetic neuropathy, or retinopathy\par he has been on metformin ER 1000 mg q AM and 500 mg q pm for at least 1 year\par he checks finger stick values fasting - refer to scanned glucose log\par also reports pre dinner values 150-235\par B: oatmeal  + banana + coffee no sugar\par L: 1/2 plate rice + cken + veg + raita on rice +water\par D: sometime does not eat / 2-3 roti + daal + veg + water\par snacks: nuts\par Last ophthalmologist visit: 3/2019\par Last podiatry visit: never\par has never seen a nutritionist\par walks on treadmill twice per week\par \par

## 2019-04-23 NOTE — ASSESSMENT
[FreeTextEntry1] : 67 yo M with PMH DM2, HLD, NPH post  shunt, benign essential tremor, PARMINDER \par \par 1. DM2 - will increase metformin to 4 tabs daily. send log in 7 days. continue statin. dietary compliance advised. refer to nutritionist\par \par 2. HLD - continue atorvastatin 20 mg qd. \par \par RV 3 months

## 2019-04-30 ENCOUNTER — APPOINTMENT (OUTPATIENT)
Dept: PULMONOLOGY | Facility: CLINIC | Age: 69
End: 2019-04-30
Payer: MEDICARE

## 2019-04-30 VITALS
HEIGHT: 65 IN | BODY MASS INDEX: 24.66 KG/M2 | OXYGEN SATURATION: 97 % | DIASTOLIC BLOOD PRESSURE: 75 MMHG | HEART RATE: 91 BPM | SYSTOLIC BLOOD PRESSURE: 114 MMHG | RESPIRATION RATE: 16 BRPM | WEIGHT: 148 LBS

## 2019-04-30 PROCEDURE — 99213 OFFICE O/P EST LOW 20 MIN: CPT

## 2019-05-01 NOTE — HISTORY OF PRESENT ILLNESS
[FreeTextEntry1] : PARMINDER  f/u\par Using oral appliance for PARMINDER\par Notes persistent EDS\par

## 2019-05-01 NOTE — PHYSICAL EXAM
[General Appearance - Well Developed] : well developed [Well Groomed] : well groomed [Normal Appearance] : normal appearance [No Deformities] : no deformities [General Appearance - Well Nourished] : well nourished [General Appearance - In No Acute Distress] : no acute distress [Normal Conjunctiva] : the conjunctiva exhibited no abnormalities [Eyelids - No Xanthelasma] : the eyelids demonstrated no xanthelasmas [Normal Oropharynx] : normal oropharynx [Neck Appearance] : the appearance of the neck was normal [Neck Cervical Mass (___cm)] : no neck mass was observed [Jugular Venous Distention Increased] : there was no jugular-venous distention [Thyroid Diffuse Enlargement] : the thyroid was not enlarged [Thyroid Nodule] : there were no palpable thyroid nodules [Heart Rate And Rhythm] : heart rate and rhythm were normal [Heart Sounds] : normal S1 and S2 [Murmurs] : no murmurs present [Respiration, Rhythm And Depth] : normal respiratory rhythm and effort [Exaggerated Use Of Accessory Muscles For Inspiration] : no accessory muscle use [Auscultation Breath Sounds / Voice Sounds] : lungs were clear to auscultation bilaterally [Abdomen Tenderness] : non-tender [Abdomen Soft] : soft [Abdomen Mass (___ Cm)] : no abdominal mass palpated [Abnormal Walk] : normal gait [Gait - Sufficient For Exercise Testing] : the gait was sufficient for exercise testing [Cyanosis, Localized] : no localized cyanosis [Nail Clubbing] : no clubbing of the fingernails [Petechial Hemorrhages (___cm)] : no petechial hemorrhages [Skin Color & Pigmentation] : normal skin color and pigmentation [No Venous Stasis] : no venous stasis [] : no rash [No Skin Ulcers] : no skin ulcer [Skin Lesions] : no skin lesions [Deep Tendon Reflexes (DTR)] : deep tendon reflexes were 2+ and symmetric [No Xanthoma] : no  xanthoma was observed [No Focal Deficits] : no focal deficits [Sensation] : the sensory exam was normal to light touch and pinprick [Impaired Insight] : insight and judgment were intact [Oriented To Time, Place, And Person] : oriented to person, place, and time [Affect] : the affect was normal

## 2019-05-01 NOTE — ASSESSMENT
[FreeTextEntry1] : Obstructive sleep apnea, currently using oral appliance. Has residual daytime sleepiness which is likely multifactorial and I do not think this would be improved by further treatment of his PARMINDER. I did  him not to drive because of his daytime sleepiness he is otherwise satisfied with his care at this point the

## 2019-05-01 NOTE — PROCEDURE
[FreeTextEntry1] : Home sleep study demonstrates mild obstructive sleep apnea with significant interval improvement compared to prior study

## 2019-05-02 ENCOUNTER — RESULT REVIEW (OUTPATIENT)
Age: 69
End: 2019-05-02

## 2019-05-02 LAB
25(OH)D3 SERPL-MCNC: 26.8 NG/ML
ALBUMIN SERPL ELPH-MCNC: 4.8 G/DL
ALP BLD-CCNC: 68 U/L
ALT SERPL-CCNC: 47 U/L
ANION GAP SERPL CALC-SCNC: 15 MMOL/L
AST SERPL-CCNC: 24 U/L
BILIRUB SERPL-MCNC: 0.3 MG/DL
BUN SERPL-MCNC: 15 MG/DL
CALCIUM SERPL-MCNC: 10.1 MG/DL
CHLORIDE SERPL-SCNC: 101 MMOL/L
CO2 SERPL-SCNC: 24 MMOL/L
CREAT SERPL-MCNC: 0.92 MG/DL
CREAT SPEC-SCNC: 236 MG/DL
GLUCOSE SERPL-MCNC: 146 MG/DL
MICROALBUMIN 24H UR DL<=1MG/L-MCNC: 1.9 MG/DL
MICROALBUMIN/CREAT 24H UR-RTO: 8 MG/G
POTASSIUM SERPL-SCNC: 5.3 MMOL/L
PROT SERPL-MCNC: 7.5 G/DL
SODIUM SERPL-SCNC: 140 MMOL/L
THYROGLOB AB SERPL-ACNC: <20 IU/ML
THYROPEROXIDASE AB SERPL IA-ACNC: <10 IU/ML

## 2019-05-13 ENCOUNTER — APPOINTMENT (OUTPATIENT)
Dept: NEUROLOGY | Facility: CLINIC | Age: 69
End: 2019-05-13
Payer: MEDICARE

## 2019-05-13 VITALS
HEIGHT: 65 IN | BODY MASS INDEX: 24.66 KG/M2 | DIASTOLIC BLOOD PRESSURE: 71 MMHG | HEART RATE: 71 BPM | WEIGHT: 148 LBS | SYSTOLIC BLOOD PRESSURE: 107 MMHG

## 2019-05-13 PROCEDURE — 99204 OFFICE O/P NEW MOD 45 MIN: CPT

## 2019-05-13 NOTE — HISTORY OF PRESENT ILLNESS
[FreeTextEntry1] : 68 M who has seen dR Alaniz and Shiraz. He was noted to have possible NPH and had left  shunt placed last summer. His wife noticed right facial twitching that the patient has not noticed. It can occur at any time of day and patient is not aware of the movements. It lasts for just a few seconds. A video of this was shown to me on wife's phone. It looks more like jaw movements in sleep. There's no muscle fasciculations and his sensorium is not affected. \par \par He states his appetite has decreased since the surgery.

## 2019-05-13 NOTE — PHYSICAL EXAM
[General Appearance - Alert] : alert [General Appearance - Well Developed] : well developed [Oriented To Time, Place, And Person] : oriented to person, place, and time [Person] : oriented to person [Place] : oriented to place [Time] : oriented to time [Short Term Intact] : short term memory intact [Span Intact] : the attention span was normal [Fluency] : fluency intact [Current Events] : adequate knowledge of current events [Cranial Nerves Optic (II)] : visual acuity intact bilaterally,  visual fields full to confrontation, pupils equal round and reactive to light [Cranial Nerves Oculomotor (III)] : extraocular motion intact [Cranial Nerves Trigeminal (V)] : facial sensation intact symmetrically [Cranial Nerves Facial (VII)] : face symmetrical [Cranial Nerves Vestibulocochlear (VIII)] : hearing was intact bilaterally [Cranial Nerves Glossopharyngeal (IX)] : tongue and palate midline [Cranial Nerves Accessory (XI - Cranial And Spinal)] : head turning and shoulder shrug symmetric [Cranial Nerves Hypoglossal (XII)] : there was no tongue deviation with protrusion [Motor Tone] : muscle tone was normal in all four extremities [Motor Strength] : muscle strength was normal in all four extremities [Sensation Tactile Decrease] : light touch was intact [Coordination - Dysmetria Impaired Finger-to-Nose Bilateral] : not present [1+] : Patella left 1+ [FreeTextEntry8] : s [Full Pulse] : the pedal pulses are present [Extraocular Movements] : extraocular movements were intact [Abnormal Walk] : normal gait [] : no rash

## 2019-05-14 ENCOUNTER — APPOINTMENT (OUTPATIENT)
Dept: OTOLARYNGOLOGY | Facility: CLINIC | Age: 69
End: 2019-05-14
Payer: MEDICARE

## 2019-05-14 DIAGNOSIS — H90.42 SENSORINEURAL HEARING LOSS, UNILATERAL, LEFT EAR, WITH UNRESTRICTED HEARING ON THE CONTRALATERAL SIDE: ICD-10-CM

## 2019-05-14 DIAGNOSIS — Z71.89 OTHER SPECIFIED COUNSELING: ICD-10-CM

## 2019-05-14 DIAGNOSIS — Z86.69 PERSONAL HISTORY OF OTHER DISEASES OF THE NERVOUS SYSTEM AND SENSE ORGANS: ICD-10-CM

## 2019-05-14 DIAGNOSIS — H91.93 UNSPECIFIED HEARING LOSS, BILATERAL: ICD-10-CM

## 2019-05-14 PROCEDURE — 99204 OFFICE O/P NEW MOD 45 MIN: CPT

## 2019-05-14 RX ORDER — VITAMIN B COMPLEX
CAPSULE ORAL
Refills: 0 | Status: ACTIVE | COMMUNITY

## 2019-05-15 PROBLEM — H90.42 LEFT ASYMMETRICAL SNHL: Status: ACTIVE | Noted: 2019-05-15

## 2019-05-15 PROBLEM — H91.93 BILATERAL HEARING LOSS: Status: ACTIVE | Noted: 2019-05-14

## 2019-05-15 NOTE — HISTORY OF PRESENT ILLNESS
[de-identified] : 68 year old male initial visit for bilateral hearing loss and hearing aid evaluation/consult.  Patient states he has had hearing issues for the past 10 years.  States sound injury to ears many years ago precipitated hearing impairment, Left worse than Right.  Reports were given hearing aids but were uncomfortable to use, plastic-like composition.  Denies otalgia, otorrhea, tinnitus, dizziness, vertigo, headaches related to ears and ear infections in the past year.  History of  shunt surgery secondary to Hydrocephalus.  Denies CT/MRI scans.  Last audio March 2019.

## 2019-05-15 NOTE — REASON FOR VISIT
[Initial Evaluation] : an initial evaluation for [Spouse] : spouse [FreeTextEntry2] : Initial visit for bilateral hearing loss and hearing aid evaluation/consult.

## 2019-05-15 NOTE — REVIEW OF SYSTEMS
[Hearing Loss] : hearing loss [Snoring With Pauses] : snoring with pauses [As Noted in HPI] : as noted in HPI [Negative] : Heme/Lymph [FreeTextEntry1] : daytime sleepiness, feels cooler than others.

## 2019-06-04 ENCOUNTER — APPOINTMENT (OUTPATIENT)
Dept: NEUROSURGERY | Facility: CLINIC | Age: 69
End: 2019-06-04
Payer: MEDICARE

## 2019-06-04 VITALS
SYSTOLIC BLOOD PRESSURE: 112 MMHG | OXYGEN SATURATION: 96 % | HEART RATE: 74 BPM | DIASTOLIC BLOOD PRESSURE: 76 MMHG | WEIGHT: 148 LBS | HEIGHT: 65 IN | RESPIRATION RATE: 16 BRPM | BODY MASS INDEX: 24.66 KG/M2 | TEMPERATURE: 98.3 F

## 2019-06-04 PROCEDURE — 99214 OFFICE O/P EST MOD 30 MIN: CPT | Mod: 25

## 2019-06-04 PROCEDURE — 62252 CSF SHUNT REPROGRAM: CPT

## 2019-06-04 NOTE — PHYSICAL EXAM
[General Appearance - In No Acute Distress] : in no acute distress [General Appearance - Alert] : alert [General Appearance - Well Developed] : well developed [General Appearance - Well-Appearing] : healthy appearing [General Appearance - Well Nourished] : well nourished [Oriented To Time, Place, And Person] : oriented to person, place, and time [Impaired Insight] : insight and judgment were intact [Mood] : the mood was normal [Affect] : the affect was normal [Memory Recent] : recent memory was not impaired [Person] : oriented to person [Place] : oriented to place [Time] : oriented to time [Cranial Nerves Optic (II)] : visual acuity intact bilaterally,  pupils equal round and reactive to light [Cranial Nerves Oculomotor (III)] : extraocular motion intact [Cranial Nerves Facial (VII)] : face symmetrical [Cranial Nerves Trigeminal (V)] : facial sensation intact symmetrically [Cranial Nerves Vestibulocochlear (VIII)] : hearing was intact bilaterally [Cranial Nerves Glossopharyngeal (IX)] : tongue and palate midline [Cranial Nerves Hypoglossal (XII)] : there was no tongue deviation with protrusion [Cranial Nerves Accessory (XI - Cranial And Spinal)] : head turning and shoulder shrug symmetric [Motor Strength] : muscle strength was normal in all four extremities [Motor Tone] : muscle tone was normal in all four extremities [Involuntary Movements] : no involuntary movements were seen [Motor Handedness Left-Handed] : the patient is left hand dominant [Sclera] : the sclera and conjunctiva were normal [Extraocular Movements] : extraocular movements were intact [PERRL With Normal Accommodation] : pupils were equal in size, round, reactive to light, with normal accommodation [Neck Appearance] : the appearance of the neck was normal [Outer Ear] : the ears and nose were normal in appearance [Hearing Threshold Finger Rub Not Beaver] : hearing was normal [Exaggerated Use Of Accessory Muscles For Inspiration] : no accessory muscle use [Heart Rate And Rhythm] : heart rate was normal and rhythm regular [Respiration, Rhythm And Depth] : normal respiratory rhythm and effort [Skin Color & Pigmentation] : normal skin color and pigmentation [] : no rash [Sensation Tactile Decrease] : light touch was intact [FreeTextEntry8] : Normal gait but with some difficulty with tandem walking [Sensation Pain / Temperature Decrease] : pain and temperature was intact [Able to heel walk] : the patient was able to heel walk [Able to toe walk] : the patient was able to toe walk [Apical Impulse] : the apical impulse was normal [FreeTextEntry1] : slow steady gait

## 2019-06-04 NOTE — HISTORY OF PRESENT ILLNESS
[FreeTextEntry1] : JOSELO MADDOX is a pleasant 69 year old male here on 6/4/19, 9 months after undergoing a right ventriculoperitoneal shunt with a Codman Certas valve set at 6 and a laparoscopic insertion of the abdominal end on 7/20/2018. Shunt depresses and refills briskly.  Setting confirmed to be set at 6.\par \par Denies headache, seizure (never), confusion, visual/smell/taste/hearing disturbance, gait disturbance, or imbalance. He states that his memory is good although his wife states that he forgets sometimes. Gait is  steady and does not require the use an ambulation aid. He is ambidextrous.\par \par His wife reports that she noticed some right facial twitching that lasts about 1 minute. He does not notice it. It occurs during waking hours. They have followed up with Dr. Stephania Sales who has ordered 72 hr EEG.  \par \par TUG test today is 10 seconds.  Last visit 1/29/19 was 11 seconds.\par \par \par \par

## 2019-06-10 ENCOUNTER — APPOINTMENT (OUTPATIENT)
Dept: CT IMAGING | Facility: IMAGING CENTER | Age: 69
End: 2019-06-10
Payer: MEDICARE

## 2019-06-10 ENCOUNTER — APPOINTMENT (OUTPATIENT)
Dept: PHARMACY | Facility: CLINIC | Age: 69
End: 2019-06-10
Payer: SELF-PAY

## 2019-06-10 ENCOUNTER — OUTPATIENT (OUTPATIENT)
Dept: OUTPATIENT SERVICES | Facility: HOSPITAL | Age: 69
LOS: 1 days | End: 2019-06-10
Payer: MEDICARE

## 2019-06-10 DIAGNOSIS — I83.90 ASYMPTOMATIC VARICOSE VEINS OF UNSPECIFIED LOWER EXTREMITY: Chronic | ICD-10-CM

## 2019-06-10 DIAGNOSIS — Z98.890 OTHER SPECIFIED POSTPROCEDURAL STATES: Chronic | ICD-10-CM

## 2019-06-10 DIAGNOSIS — G91.2 (IDIOPATHIC) NORMAL PRESSURE HYDROCEPHALUS: ICD-10-CM

## 2019-06-10 PROCEDURE — 70450 CT HEAD/BRAIN W/O DYE: CPT

## 2019-06-10 PROCEDURE — 70450 CT HEAD/BRAIN W/O DYE: CPT | Mod: 26

## 2019-06-10 PROCEDURE — V5010 ASSESSMENT FOR HEARING AID: CPT | Mod: NC

## 2019-06-11 ENCOUNTER — APPOINTMENT (OUTPATIENT)
Dept: NEUROLOGY | Facility: CLINIC | Age: 69
End: 2019-06-11
Payer: MEDICARE

## 2019-06-11 PROCEDURE — 95816 EEG AWAKE AND DROWSY: CPT

## 2019-06-12 PROCEDURE — 95953: CPT

## 2019-06-13 PROCEDURE — 95953: CPT

## 2019-06-14 PROCEDURE — 95953: CPT

## 2019-06-18 ENCOUNTER — RESULT REVIEW (OUTPATIENT)
Age: 69
End: 2019-06-18

## 2019-06-18 DIAGNOSIS — G51.4 FACIAL MYOKYMIA: ICD-10-CM

## 2019-06-27 ENCOUNTER — APPOINTMENT (OUTPATIENT)
Dept: NEUROLOGY | Facility: CLINIC | Age: 69
End: 2019-06-27
Payer: MEDICARE

## 2019-06-27 VITALS
OXYGEN SATURATION: 97 % | HEIGHT: 65 IN | SYSTOLIC BLOOD PRESSURE: 116 MMHG | WEIGHT: 148 LBS | BODY MASS INDEX: 24.66 KG/M2 | DIASTOLIC BLOOD PRESSURE: 76 MMHG | HEART RATE: 65 BPM

## 2019-06-27 VITALS — DIASTOLIC BLOOD PRESSURE: 75 MMHG | SYSTOLIC BLOOD PRESSURE: 116 MMHG

## 2019-06-27 PROCEDURE — 99215 OFFICE O/P EST HI 40 MIN: CPT

## 2019-06-27 NOTE — HISTORY OF PRESENT ILLNESS
[FreeTextEntry1] : This is a 69-year-old ambidextrous male who presents with his wife Lelia. Patient is here to rule out Parkinson's disease. He has been noticing facial twitching.\par \par Patient states that he has 35+ years history of essential tremor which is pretty well controlled with propanolol. He has recently increased the dose to 40 mg in the morning and 20 mg in the evening. About 2 years ago in 2017 he was noted to have difficulty with walking increased urinary frequency which did get better with BPH medications. He underwent  shunting for normal pressure hydrocephalus. He states initially he had very good response however for about a month now he is noticing that his walking has become slower and. He may shuffle. He has not had any falls. He is also noticing some twitching around his face. And memory is not as good-  he is having difficulty keeping track of dates and times. No hallucinations. has REM behavior disorder. He has sleep apnea for which he uses a dental guard and follows with a pulmonologist.\par \par Mild memory changes as above. REM behavior disorder. Anosmia for about a year. No headaches no change in vision no difficulty swallowing some drooling is present especially at nighttime he has chronic constipation. No difficulty with control of urine. No difficulty controlling blood pressure.\par \par Review of systems a complete review of systems is performed and is negative except as listed in history of present illness\par \par Family history: brother had subarachnoid hemorrhage secondary to aneurysm \par father passed away at the age of 66. \par Mother lived to be in her late 90s. \par No family history of tremors or Parkinson's disease.\par \par Past medical history\par NPH status post  shunt \par diabetes mellitus type 2 \par obstructive sleep apnea \par essential tremor\par High cholesterol\par Gingivitis\par Cardiac catheterization\par \par Current medications\par Propranolol\par Lipitor\par Metformin \par Flomax\par \par \par \par

## 2019-06-27 NOTE — DISCUSSION/SUMMARY
[FreeTextEntry1] : This is a 69-year-old male who presents with chief complaints of jaw movement and concerns of Parkinson's disease. On exam he has a jaw tremor. Slight bradykinesia on the right side, reduced facial expression. He also has anosmia and REM behavior disorder. He endorses symptoms of depression. He is noted to have tilting of his head to the left shoulder he states this is present since grade 12 and has not changed. He has history of NPH status post  shunt\par \par Plan\par Dusty  scan - Patient states that he will be going to [place] in the next few days and would like to get this done on his return \par Offered a trial of an antidepressant. Patient states i he could be depressed because he recently increased propranolol and would like to cut back the dose to 20 mg twice a day and then evaluate\par offered psychiatry evaluation - declines at present\par Followup in 3 months\par Advised to use melatonin 3 mg at bedtime for possible REM behavior disorder

## 2019-06-27 NOTE — PHYSICAL EXAM
[FreeTextEntry1] : Patient is awake and alert. He is pleasant cooperative with the exam.\par Facial expression is reduced especially  blink rate. Tilting of the head to the left shoulder is seen. No resting action or postural tremors are seen. Patient has slight reduction of finger taps on the right normal on the left leg agility is normal. Tone is normal bilaterally. Extraocular movements are intact. Face is symmetric tongue and uvula midline and symmetric.  patient does have a jaw tremor. No tongue tremor is present. No dysmetria. Strength is 5/5. No difficulty getting up from the chair gait.   noted to be slightly wide-based with reduced step height. He declines to walk in a straight line as he is afraid he will fall. He radu  spirals which are scanned into  EHR. Handwriting is normal in size.

## 2019-09-30 ENCOUNTER — APPOINTMENT (OUTPATIENT)
Dept: NEUROLOGY | Facility: CLINIC | Age: 69
End: 2019-09-30

## 2019-10-01 ENCOUNTER — APPOINTMENT (OUTPATIENT)
Dept: ENDOCRINOLOGY | Facility: CLINIC | Age: 69
End: 2019-10-01

## 2019-10-28 ENCOUNTER — APPOINTMENT (OUTPATIENT)
Dept: NEUROLOGY | Facility: CLINIC | Age: 69
End: 2019-10-28
Payer: MEDICARE

## 2019-10-28 VITALS
DIASTOLIC BLOOD PRESSURE: 72 MMHG | BODY MASS INDEX: 24.16 KG/M2 | WEIGHT: 145 LBS | SYSTOLIC BLOOD PRESSURE: 106 MMHG | HEIGHT: 65 IN | HEART RATE: 86 BPM

## 2019-10-28 VITALS — HEART RATE: 88 BPM | DIASTOLIC BLOOD PRESSURE: 80 MMHG | SYSTOLIC BLOOD PRESSURE: 118 MMHG

## 2019-10-28 PROCEDURE — 99214 OFFICE O/P EST MOD 30 MIN: CPT

## 2019-10-28 NOTE — PHYSICAL EXAM
[FreeTextEntry1] : Patient is awake and alert. He is pleasant cooperative with the exam.\par Facial expression is reduced especially  blink rate. Tilting of the head to the left shoulder is seen. No resting action or postural tremors are seen. Patient has slight reduction of finger taps on the right normal on the left leg agility is normal. Tone is normal bilaterally. Extraocular movements are intact. Face is symmetric tongue and uvula midline and symmetric.  patient does have a  prominent jaw tremor. No tongue tremor is present. No dysmetria. Strength is 5/5. No difficulty getting up from the chair gait.   noted to be slightly wide-based with reduced step height. He declines to walk in a straight line as he is afraid he will fall.

## 2019-10-28 NOTE — DISCUSSION/SUMMARY
[FreeTextEntry1] : This is a 69-year-old male who presents with chief complaints of jaw movement and concerns of Parkinson's disease. On exam he has a jaw tremor. Slight bradykinesia on the right side, reduced facial expression. He also has anosmia and REM behavior disorder. He endorses symptoms of depression. cognitive changes,night time drooling, poor appetite with 3-4 lbs wt loss, urinary frequency, orthostatic hypotension.He is noted to have tilting of his head to the left shoulder he states this is present since grade 12 and has not changed. 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

## 2019-10-28 NOTE — HISTORY OF PRESENT ILLNESS
[FreeTextEntry1] : This is a 69-year-old ambidextrous male who presents with his wife Lelia. Patient is here to rule out Parkinson's disease. He has been noticing facial twitching.\par \par Patient states that he has 35+ years history of essential tremor which is pretty well controlled with propanolol. He has recently increased the dose to 40 mg in the morning and 20 mg in the evening. About 2 years ago in 2017 he was noted to have difficulty with walking increased urinary frequency which did get better with BPH medications. He underwent  shunting for normal pressure hydrocephalus. He states initially he had very good response however for about a month now he is noticing that his walking has become slower and. He may shuffle. He has not had any falls. He is also noticing some twitching around his face. And memory is not as good-  he is having difficulty keeping track of dates and times. No hallucinations. has REM behavior disorder. He has sleep apnea for which he uses a dental guard and follows with a pulmonologist.\par \par Mild memory changes as above. REM behavior disorder. Anosmia for about a year. No headaches no change in vision no difficulty swallowing some drooling is present especially at nighttime he has chronic constipation. No difficulty with control of urine. No difficulty controlling blood pressure.\par \par Review of systems a complete review of systems is performed and is negative except as listed in history of present illness\par \par Family history: brother had subarachnoid hemorrhage secondary to aneurysm \par father passed away at the age of 66. \par Mother lived to be in her late 90s. \par No family history of tremors or Parkinson's disease.\par \par Past medical history\par NPH status post  shunt \par diabetes mellitus type 2 \par obstructive sleep apnea \par essential tremor\par High cholesterol\par Gingivitis\par Cardiac catheterization\par \par Current medications\par Propranolol\par Lipitor\par Metformin \par Flomax\par \par interval history: Mr. Rangel has returned from [place]. He continues to experience jaw tremor which has slightly worsened. He is taking propranolol 30 mg in the morning and 20 in the evening. She does go to orthostatic at times. He has not had any falls. No difficulty swallowing. His appetite is poor and he has lost about 3-4 pounds. He has urinary frequency. He has anosmia and REM behavior disorder. He has sleep apnea and uses a dental guard note is that the jaw tremor is less when he wears the dental guard. The tremor that he has increased over time. He is also sleepy during daytime. Memory is also affected\par \par

## 2019-11-05 ENCOUNTER — APPOINTMENT (OUTPATIENT)
Dept: PULMONOLOGY | Facility: CLINIC | Age: 69
End: 2019-11-05
Payer: MEDICARE

## 2019-11-05 VITALS
BODY MASS INDEX: 24.16 KG/M2 | TEMPERATURE: 97.8 F | OXYGEN SATURATION: 96 % | DIASTOLIC BLOOD PRESSURE: 74 MMHG | HEIGHT: 65 IN | HEART RATE: 77 BPM | WEIGHT: 145 LBS | SYSTOLIC BLOOD PRESSURE: 110 MMHG

## 2019-11-05 PROCEDURE — 99212 OFFICE O/P EST SF 10 MIN: CPT

## 2019-11-06 ENCOUNTER — APPOINTMENT (OUTPATIENT)
Dept: PHARMACY | Facility: CLINIC | Age: 69
End: 2019-11-06
Payer: SELF-PAY

## 2019-11-06 PROCEDURE — V5010 ASSESSMENT FOR HEARING AID: CPT

## 2019-11-07 NOTE — HISTORY OF PRESENT ILLNESS
[FreeTextEntry1] : PARMINDER  f/u\par Using oral appliance for PARMINDER\par Notes persistent EDS\par Being evaluated for possible parkiinsons disease-has jaw tremor\par

## 2019-11-07 NOTE — ASSESSMENT
[FreeTextEntry1] : Hypersomnia likely related to underlying medical disorder in addition to sleep apnea\par Continue oral appliance therapy\par  Doubt jaw tremor related to use of oral appliance but will have patient reassess by dentistry\par Reassess patient after SUNDEEP scan for Parkinson's disease

## 2019-11-07 NOTE — PHYSICAL EXAM
[General Appearance - Well Developed] : well developed [Normal Appearance] : normal appearance [Well Groomed] : well groomed [General Appearance - Well Nourished] : well nourished [No Deformities] : no deformities [General Appearance - In No Acute Distress] : no acute distress [Normal Conjunctiva] : the conjunctiva exhibited no abnormalities [Eyelids - No Xanthelasma] : the eyelids demonstrated no xanthelasmas [Normal Oropharynx] : normal oropharynx [Neck Appearance] : the appearance of the neck was normal [Neck Cervical Mass (___cm)] : no neck mass was observed [Jugular Venous Distention Increased] : there was no jugular-venous distention [Thyroid Diffuse Enlargement] : the thyroid was not enlarged [Thyroid Nodule] : there were no palpable thyroid nodules [Heart Rate And Rhythm] : heart rate and rhythm were normal [Heart Sounds] : normal S1 and S2 [Murmurs] : no murmurs present [Respiration, Rhythm And Depth] : normal respiratory rhythm and effort [Exaggerated Use Of Accessory Muscles For Inspiration] : no accessory muscle use [Auscultation Breath Sounds / Voice Sounds] : lungs were clear to auscultation bilaterally [Abdomen Soft] : soft [Abdomen Tenderness] : non-tender [Abdomen Mass (___ Cm)] : no abdominal mass palpated [Abnormal Walk] : normal gait [Gait - Sufficient For Exercise Testing] : the gait was sufficient for exercise testing [Nail Clubbing] : no clubbing of the fingernails [Cyanosis, Localized] : no localized cyanosis [Petechial Hemorrhages (___cm)] : no petechial hemorrhages [Skin Color & Pigmentation] : normal skin color and pigmentation [] : no rash [No Venous Stasis] : no venous stasis [Skin Lesions] : no skin lesions [No Skin Ulcers] : no skin ulcer [No Xanthoma] : no  xanthoma was observed [Deep Tendon Reflexes (DTR)] : deep tendon reflexes were 2+ and symmetric [No Focal Deficits] : no focal deficits [Sensation] : the sensory exam was normal to light touch and pinprick [Oriented To Time, Place, And Person] : oriented to person, place, and time [Impaired Insight] : insight and judgment were intact [Affect] : the affect was normal

## 2019-11-18 ENCOUNTER — APPOINTMENT (OUTPATIENT)
Dept: NEUROLOGY | Facility: CLINIC | Age: 69
End: 2019-11-18
Payer: MEDICARE

## 2019-11-18 VITALS
DIASTOLIC BLOOD PRESSURE: 66 MMHG | BODY MASS INDEX: 24.16 KG/M2 | HEART RATE: 71 BPM | SYSTOLIC BLOOD PRESSURE: 106 MMHG | WEIGHT: 145 LBS | HEIGHT: 65 IN

## 2019-11-18 VITALS — DIASTOLIC BLOOD PRESSURE: 75 MMHG | SYSTOLIC BLOOD PRESSURE: 117 MMHG | HEART RATE: 65 BPM

## 2019-11-18 PROCEDURE — 99214 OFFICE O/P EST MOD 30 MIN: CPT

## 2019-11-18 NOTE — PHYSICAL EXAM
[FreeTextEntry1] : Patient is awake and alert. He is pleasant cooperative with the exam.\par Facial expression is slightly reduced especially  blink rate. Tilting of the head to the left shoulder is seen. No resting action or postural tremors are seen. Patient has ? slight reduction of finger taps on the right normal on the left leg agility is normal. Tone is normal bilaterally. Extraocular movements are intact. Face is symmetric tongue and uvula midline and symmetric.  patient does have a  prominent jaw tremor side to side. no head tremor.  No tongue tremor is present. No dysmetria. Strength is 5/5. No difficulty getting up from the chair gait.   noted to be slightly wide-based with reduced step height. He declines to walk in a straight line as he is afraid he will fall. sl reduced L arm swing. speech is clear

## 2019-11-18 NOTE — DISCUSSION/SUMMARY
[FreeTextEntry1] : This is a 69-year-old male who presents with chief complaints of jaw movement and slowed walking- concerns of Parkinson's disease. On exam he has a jaw tremor.? if there is  Slight bradykinesia on the right side, reduced facial expression. He also has  REM behavior disorder. He endorses symptoms of depression. cognitive changes,night time drooling, poor appetite with 3-4 lbs wt loss, urinary frequency, orthostatic hypotension (not currently) .He is noted to have tilting of his head to the left shoulder he states this is present since grade 12 and has not changed. He has history of NPH status post  shunt\par Impression: NPH, Unclear etiology of jaw tremor. ? parkinsons. ET usually associated with head tremor, pt has jaw tremor only. hand tremors are well controlled with propranolol. jaw dystonia can also cause tremors, but pt has no difficulty with jaw opening, closing, talking eating or with speech. neck tilting is chronic since grade 12.\par Plan\par Re appeal Dusty  scan \par Followup in 1 month, will trial sinemet, side effects discussed in detail. will monitor for any improvement in tremor, walking\par Advised to use melatonin 3 mg at bedtime for REM behavior disorder.\par would also advise considering sleep study, follows with pulmonary for PARMINDER, still with EDS\par other options such as increasing propranolol as tolerated, clonazepam (sedation s/e).  benztropine, primidone , amantadine (can cause cognitive s/e)\par mirtazepine can be considered for anxiety and reduced appetite.

## 2019-11-18 NOTE — HISTORY OF PRESENT ILLNESS
[FreeTextEntry1] : This is a 69-year-old ambidextrous male who presents with his wife Lelia. Patient is here to rule out Parkinson's disease. He has been noticing facial twitching.\par \par Patient states that he has 35+ years history of essential tremor which is pretty well controlled with propanolol. He has recently increased the dose to 40 mg in the morning and 20 mg in the evening. About 2 years ago in 2017 he was noted to have difficulty with walking increased urinary frequency which did get better with BPH medications. He underwent  shunting for normal pressure hydrocephalus. He states initially he had very good response however for about a month now he is noticing that his walking has become slower and. He may shuffle. He has not had any falls. He is also noticing some twitching around his face. And memory is not as good-  he is having difficulty keeping track of dates and times. No hallucinations. has REM behavior disorder. He has sleep apnea for which he uses a dental guard and follows with a pulmonologist.\par \par Mild memory changes as above. REM behavior disorder. Anosmia for about a year. No headaches no change in vision no difficulty swallowing some drooling is present especially at nighttime he has chronic constipation. No difficulty with control of urine. No difficulty controlling blood pressure.\par \par Review of systems a complete review of systems is performed and is negative except as listed in history of present illness\par \par Family history: brother had subarachnoid hemorrhage secondary to aneurysm \par father passed away at the age of 66. \par Mother lived to be in her late 90s. \par No family history of tremors or Parkinson's disease.\par \par Past medical history\par NPH status post  shunt \par diabetes mellitus type 2 \par obstructive sleep apnea \par essential tremor\par High cholesterol\par Gingivitis\par Cardiac catheterization\par \par Current medications\par Propranolol\par Lipitor\par Metformin \par Flomax\par \par interval history: accompanied by wife and son. Mr. Rangel continues to experience jaw tremor. He is taking propranolol 40 mg in the morning and 20 in the evening. ha been orthostatic in past. He has not had any falls. No difficulty swallowing. His appetite is poor and he has lost about 3-4 pounds. He has urinary frequency. He denies anosmia today and has some talking in his sleep, which is better with use of dental guard He has sleep apnea and uses a dental guard note is that the jaw tremor is less when he wears the dental guard. The tremor that he has increased over time. He is also sleepy during daytime. Memory is mildly affected. gait is slow, night time drooling, chr constipation. anxiety.\par head tilt to L is chronic. SUNDEEP scan was denied\par \par

## 2019-11-20 ENCOUNTER — APPOINTMENT (OUTPATIENT)
Dept: PHARMACY | Facility: CLINIC | Age: 69
End: 2019-11-20

## 2019-11-22 ENCOUNTER — APPOINTMENT (OUTPATIENT)
Dept: PHARMACY | Facility: CLINIC | Age: 69
End: 2019-11-22

## 2019-11-27 ENCOUNTER — APPOINTMENT (OUTPATIENT)
Dept: PHARMACY | Facility: CLINIC | Age: 69
End: 2019-11-27
Payer: SELF-PAY

## 2019-11-27 PROCEDURE — V5220C: CUSTOM

## 2019-12-04 ENCOUNTER — APPOINTMENT (OUTPATIENT)
Dept: GASTROENTEROLOGY | Facility: CLINIC | Age: 69
End: 2019-12-04
Payer: MEDICARE

## 2019-12-04 VITALS
TEMPERATURE: 97.3 F | OXYGEN SATURATION: 97 % | BODY MASS INDEX: 24.32 KG/M2 | HEIGHT: 65 IN | DIASTOLIC BLOOD PRESSURE: 64 MMHG | WEIGHT: 146 LBS | HEART RATE: 79 BPM | RESPIRATION RATE: 15 BRPM | SYSTOLIC BLOOD PRESSURE: 104 MMHG

## 2019-12-04 PROCEDURE — 36415 COLL VENOUS BLD VENIPUNCTURE: CPT

## 2019-12-04 PROCEDURE — 99214 OFFICE O/P EST MOD 30 MIN: CPT | Mod: 25

## 2019-12-08 NOTE — PHYSICAL EXAM
[General Appearance - In No Acute Distress] : in no acute distress [General Appearance - Well Nourished] : well nourished [General Appearance - Alert] : alert [General Appearance - Well-Appearing] : healthy appearing [General Appearance - Well Developed] : well developed [Oropharynx] : the oropharynx was normal [Sclera] : the sclera and conjunctiva were normal [Neck Cervical Mass (___cm)] : no neck mass was observed [Neck Appearance] : the appearance of the neck was normal [Respiration, Rhythm And Depth] : normal respiratory rhythm and effort [Exaggerated Use Of Accessory Muscles For Inspiration] : no accessory muscle use [Auscultation Breath Sounds / Voice Sounds] : lungs were clear to auscultation bilaterally [Heart Sounds Gallop] : no gallops [Heart Sounds] : normal S1 and S2 [Heart Rate And Rhythm] : heart rate was normal and rhythm regular [Murmurs] : no murmurs [Heart Sounds Pericardial Friction Rub] : no pericardial rub [Edema] : there was no peripheral edema [Abdomen Soft] : soft [] : no hepato-splenomegaly [Abdomen Tenderness] : non-tender [Cervical Lymph Nodes Enlarged Posterior Bilaterally] : posterior cervical [Abdomen Mass (___ Cm)] : no abdominal mass palpated [Abdomen Hernia] : no hernia was discovered [Cervical Lymph Nodes Enlarged Anterior Bilaterally] : anterior cervical [Supraclavicular Lymph Nodes Enlarged Bilaterally] : supraclavicular [No CVA Tenderness] : no ~M costovertebral angle tenderness [No Spinal Tenderness] : no spinal tenderness [Abnormal Walk] : normal gait [Skin Color & Pigmentation] : normal skin color and pigmentation [Nail Clubbing] : no clubbing  or cyanosis of the fingernails [Impaired Insight] : insight and judgment were intact [Oriented To Time, Place, And Person] : oriented to person, place, and time [Affect] : the affect was normal [Mood] : the mood was normal [Bowel Sounds] : normal bowel sounds [FreeTextEntry1] : Facial tremor evident.

## 2019-12-08 NOTE — REASON FOR VISIT
[Follow-Up: _____] : a [unfilled] follow-up visit [Spouse] : spouse [FreeTextEntry1] : regarding decreased appetite.

## 2019-12-08 NOTE — REVIEW OF SYSTEMS
[As Noted in HPI] : as noted in HPI [Anxiety] : anxiety [Depression] : depression [Recent Weight Gain (___ Lbs)] : no recent weight gain [Recent Weight Loss (___ Lbs)] : no recent weight loss [FreeTextEntry9] : Back pain.

## 2019-12-08 NOTE — ASSESSMENT
[FreeTextEntry1] : Impression:\par \par #1 decreased appetite-reported of one year duration. Somewhat associated with decreased taste as well as long-standing history of component of anosmia. No associated GI symptoms-essentially asymptomatic from a GI standpoint. No weight change-as noted, only 2 pounds less than at prior assessment of 2/2018. suspect decrease appetite multifactorial and significant issues of NPH status post  shunt 7/2018, neurologic issues of facial tremor, report of depression and anxiety, as well as long history of component of anosmia is likely all contributory regarding decreased appetite. As noted, currently asymptomatic from a GI standpoint and suspect that a primary GI pathologic process is unlikely as cause of patient's decreased appetite.\par \par #2 history of colonic polyps-status post removal of a 6 mm transverse colon hyperplastic polyp and 4 mm proximal descending colon adenoma at the 9/22/14 colonoscopy. Rule out metachronous lesions. Colonoscopy on 3/19/18 was noted for removal of a 3 mm mid ascending colon nonneoplastic process and revealed a markedly redundant and tortuous colon.\par \par #3 chronic constipation-history consistent with normal transit constipation of functional etiology. Normalizes in conjunction with high-fiber diet. Improved at current time.\par \par #4 diabetes.\par \par #5 hypercholesterolemia.\par \par #6 benign tremor.\par \par #7 obstructive sleep apnea.\par \par #8 normal pressure hydrocephalus-status post  shunt 7/2018.\par \par #9 history of anxiety and depression.\par \par #10 chronic history of component of anosmia.

## 2019-12-08 NOTE — CONSULT LETTER
[Dear  ___] : Dear  [unfilled], [Consult Letter:] : I had the pleasure of evaluating your patient, [unfilled]. [Please see my note below.] : Please see my note below. [Consult Closing:] : Thank you very much for allowing me to participate in the care of this patient.  If you have any questions, please do not hesitate to contact me. [Sincerely,] : Sincerely, [( Thank you for referring [unfilled] for consultation for _____ )] : Thank you for referring [unfilled] for consultation for [unfilled] [FreeTextEntry3] : Roe Ordaz M.D. [FreeTextEntry2] : Dr. Richard Mendoza

## 2019-12-09 ENCOUNTER — APPOINTMENT (OUTPATIENT)
Dept: NEUROLOGY | Facility: CLINIC | Age: 69
End: 2019-12-09
Payer: MEDICARE

## 2019-12-09 ENCOUNTER — APPOINTMENT (OUTPATIENT)
Dept: PHARMACY | Facility: CLINIC | Age: 69
End: 2019-12-09
Payer: MEDICARE

## 2019-12-09 VITALS
WEIGHT: 145 LBS | SYSTOLIC BLOOD PRESSURE: 108 MMHG | HEIGHT: 65 IN | HEART RATE: 72 BPM | BODY MASS INDEX: 24.16 KG/M2 | DIASTOLIC BLOOD PRESSURE: 68 MMHG

## 2019-12-09 PROCEDURE — 99214 OFFICE O/P EST MOD 30 MIN: CPT

## 2019-12-09 PROCEDURE — V5299A: CUSTOM | Mod: NC

## 2019-12-09 NOTE — PHYSICAL EXAM
[FreeTextEntry1] : Patient is awake and alert. He is pleasant cooperative with the exam.\par Facial expression is slightly reduced especially  blink rate. Tilting of the head to the left shoulder is seen. No pain or limitation of neck movement. near constant jaw tremor is present. No resting action or postural tremors are seen. Patient has ? slight reduction of finger taps on the right normal on the left leg agility is normal. Tone is normal bilaterally. Extraocular movements are intact. Face is symmetric tongue and uvula midline and symmetric.  patient does have a  prominent jaw tremor side to side. no head tremor.  No tongue tremor is present. No dysmetria. Strength is 5/5. No difficulty getting up from the chair gait.   noted to be slightly wide-based with reduced step height. He declines to walk in a straight line as he is afraid he will fall. sl reduced L arm swing. speech is clear

## 2019-12-09 NOTE — DISCUSSION/SUMMARY
[FreeTextEntry1] : This is a 69-year-old male who presents with chief complaints of jaw movement and slowed walking- concerns of Parkinson's disease. On exam he has a jaw tremor.? if there is  Slight bradykinesia on the right side, reduced facial expression. He also has  REM behavior disorder. He endorses symptoms of depression. cognitive changes,night time drooling, poor appetite with 3-4 lbs wt loss, urinary frequency, orthostatic hypotension (not currently) .He is noted to have tilting of his head to the left shoulder he states this is present since grade 12 and has not changed. He has history of NPH status post  shunt\par Impression: NPH, Unclear etiology of jaw tremor. ? parkinsons. ET usually associated with head tremor, pt has jaw tremor only. hand tremors are well controlled with propranolol.\par possibility of Dystonia was entertained  - has neck tilting (however no pain, limitation of movement, sensory tricks etc. father had similar head tilt) hand tremor with writing ? writers cramp (again no pain, posturing , no response to BTX, improved with propranolol) jaw dystonia can also cause tremors, but pt has no difficulty with jaw opening, closing, talking eating or with speech. neck tilting is chronic since grade 12.\par Plan\par Re appeal Dusty  scan \par Followup in 1 month, continue Sinemet, side effects discussed in detail. will monitor for any improvement in tremor, walking, today did not take meds since last night\par continue melatonin 3 mg at bedtime for REM behavior disorder.\par would also advise considering sleep study, follows with pulmonary for PARMINDER, still with EDS\par other options such as increasing propranolol as tolerated, clonazepam (sedation s/e).  benztropine, primidone , amantadine (can cause cognitive s/e) botox for jaw tremor\par mirtazepine can be considered for anxiety and reduced appetite.

## 2019-12-09 NOTE — HISTORY OF PRESENT ILLNESS
[FreeTextEntry1] : This is a 69-year-old ambidextrous male who presents with his wife Lelia. Patient is here to rule out Parkinson's disease. He has been noticing facial twitching.\par \par Patient states that he has 35+ years history of essential tremor which is pretty well controlled with propanolol. He has recently increased the dose to 40 mg in the morning and 20 mg in the evening. About 2 years ago in 2017 he was noted to have difficulty with walking increased urinary frequency which did get better with BPH medications. He underwent  shunting for normal pressure hydrocephalus. He states initially he had very good response however for about a month now he is noticing that his walking has become slower and. He may shuffle. He has not had any falls. He is also noticing some twitching around his face. And memory is not as good-  he is having difficulty keeping track of dates and times. No hallucinations. has REM behavior disorder. He has sleep apnea for which he uses a dental guard and follows with a pulmonologist.\par \par Mild memory changes as above. REM behavior disorder. Anosmia for about a year. No headaches no change in vision no difficulty swallowing some drooling is present especially at nighttime he has chronic constipation. No difficulty with control of urine. No difficulty controlling blood pressure.\par \par Review of systems a complete review of systems is performed and is negative except as listed in history of present illness\par \par Family history: brother had subarachnoid hemorrhage secondary to aneurysm \par father passed away at the age of 66. \par Mother lived to be in her late 90s. \par No family history of tremors or Parkinson's disease.\par \par Past medical history\par NPH status post  shunt \par diabetes mellitus type 2 \par obstructive sleep apnea \par essential tremor\par High cholesterol\par Gingivitis\par Cardiac catheterization\par \par Current medications\par Propranolol\par Lipitor\par Metformin \par Flomax\par \par interval history: chart reviewed again- has history of head tilt to L shoulder since high school. no pain, no limitation of neck movement. no sensory tricks. His Father had similar head tilt. He was having tremors in hands with writing only in the past, no cramping or dystonic posturing. improved significantly with propranolol. no response to botox injection of arm extensors. no pain. jaw tremor more recently.\par Also with numbness in R hand 1,2 and 3rd fingers - was worked up by Dr. Holm - R/o MG d/t ptosis - neg.\par accompanied by wife today. started sinemet - son emailed me that he feels that walking and thought process better. pt feels walking a little improved. wife has not noticed any major improvement, but feels that he is more sleepy on it., RBD much better. night time drooling, chr constipation. anxiety. mild cog changes \par head tilt to L is chronic. SUNDEEP scan was denied\par \par

## 2019-12-16 ENCOUNTER — APPOINTMENT (OUTPATIENT)
Dept: NEUROLOGY | Facility: CLINIC | Age: 69
End: 2019-12-16
Payer: MEDICARE

## 2019-12-16 PROCEDURE — 95910 NRV CNDJ TEST 7-8 STUDIES: CPT

## 2019-12-16 PROCEDURE — 95886 MUSC TEST DONE W/N TEST COMP: CPT

## 2019-12-30 ENCOUNTER — APPOINTMENT (OUTPATIENT)
Dept: PHARMACY | Facility: CLINIC | Age: 69
End: 2019-12-30
Payer: SELF-PAY

## 2019-12-30 PROCEDURE — V5299A: CUSTOM | Mod: NC

## 2020-01-13 ENCOUNTER — APPOINTMENT (OUTPATIENT)
Dept: PHARMACY | Facility: CLINIC | Age: 70
End: 2020-01-13
Payer: SELF-PAY

## 2020-01-13 PROCEDURE — V5299A: CUSTOM | Mod: NC

## 2020-01-15 ENCOUNTER — APPOINTMENT (OUTPATIENT)
Dept: PHARMACY | Facility: CLINIC | Age: 70
End: 2020-01-15
Payer: SELF-PAY

## 2020-01-15 PROCEDURE — V5299A: CUSTOM | Mod: NC

## 2020-01-28 ENCOUNTER — APPOINTMENT (OUTPATIENT)
Dept: NEUROLOGY | Facility: CLINIC | Age: 70
End: 2020-01-28
Payer: MEDICARE

## 2020-01-28 VITALS
WEIGHT: 145 LBS | SYSTOLIC BLOOD PRESSURE: 107 MMHG | HEART RATE: 76 BPM | HEIGHT: 65 IN | BODY MASS INDEX: 24.16 KG/M2 | DIASTOLIC BLOOD PRESSURE: 67 MMHG

## 2020-01-28 PROCEDURE — 99214 OFFICE O/P EST MOD 30 MIN: CPT

## 2020-01-29 NOTE — HISTORY OF PRESENT ILLNESS
[FreeTextEntry1] : This is a 69-year-old ambidextrous male who presents with his wife Lelia. Patient is here to rule out Parkinson's disease. He has been noticing facial twitching.\par \par Patient states that he has 35+ years history of essential tremor which is pretty well controlled with propanolol. He has recently increased the dose to 40 mg in the morning and 20 mg in the evening. About 2 years ago in 2017 he was noted to have difficulty with walking increased urinary frequency which did get better with BPH medications. He underwent  shunting for normal pressure hydrocephalus. He states initially he had very good response however for about a month now he is noticing that his walking has become slower and. He may shuffle. He has not had any falls. He is also noticing some twitching around his face. And memory is not as good-  he is having difficulty keeping track of dates and times. No hallucinations. has REM behavior disorder. He has sleep apnea for which he uses a dental guard and follows with a pulmonologist.\par \par Mild memory changes as above. REM behavior disorder. Anosmia for about a year. No headaches no change in vision no difficulty swallowing some drooling is present especially at nighttime he has chronic constipation. No difficulty with control of urine. No difficulty controlling blood pressure.\par \par Review of systems a complete review of systems is performed and is negative except as listed in history of present illness\par \par Family history: brother had subarachnoid hemorrhage secondary to aneurysm \par father passed away at the age of 66. \par Mother lived to be in her late 90s. \par No family history of tremors or Parkinson's disease.\par \par Past medical history\par NPH status post  shunt \par diabetes mellitus type 2 \par obstructive sleep apnea \par essential tremor\par High cholesterol\par Gingivitis\par Cardiac catheterization\par \par Current medications\par Propranolol\par Lipitor\par Metformin \par Flomax\par \par interval history: \par Since the last visit, his mouth tremor frequency and intensity has improved since starting Sinemet. walking is still slow. No longer feels that he is walking on a sponge. More control with walking. No falls. He does feel sleepy on Sinemet, which was happening before Sinemet, but it has worsened. However, it is not interfering with any activities. He is taking Melatonin. He had a sleep study done that showed obstructive sleep apnea, and was unable to use the machine. He wears a dental guard at night and does not snore. The sleep study improved after that. If he does not take propranolol his hands shake. His son notes that his walking has improved.\par \par

## 2020-01-29 NOTE — PHYSICAL EXAM
[FreeTextEntry1] : Patient is awake and alert. He is pleasant cooperative with the exam.  Extraocular movements are intact. Face is symmetric.\par Facial expression is slightly reduced especially  blink rate. Tilting of the head to the left shoulder is seen, variable severity. No pain or limitation of neck movement. near constant jaw tremor is present. No head tremor.  No tongue tremor is present. No resting action or postural tremors are seen. Mild intention tremors. Opening closing hands and rapid alternating movements are normal. Patient has slight reduction of finger taps on the right, 1, normal on the left. leg agility is normal. Tone is R 1, L 0. No dysmetria. No difficulty getting up from the chair gait. Stooped posture. Noted to be slightly wide-based with reduced step height. Good arm swing bilaterally.

## 2020-01-29 NOTE — DISCUSSION/SUMMARY
[FreeTextEntry1] : This is a 69-year-old male who presents for follow up of jaw movement and slowed walking- concern for Parkinson's disease. On exam he has a jaw tremor. Grade 1 bradykinesia with finger tapping on the right, reduced facial expression. He also has REM behavior disorder that has improved with melatonin. He endorses symptoms of depression. Cognitive changes, night time drooling, poor appetite with 3-4 lbs wt loss, urinary frequency, orthostatic hypotension (not currently). \par \par He is noted to have tilting of his head to the left shoulder he states this is present since grade 12 and has not changed. He has history of NPH status post  shunt\par \par Impression: NPH. Patient has had improved jaw tremor and walking since starting Sinemet. Cannot rule out Parkinson's disease. Mild bradykinesia with finger tapping on the right. Hand tremors are controlled with propranolol. Possibility of Dystonia was entertained  - has neck tilting (however no pain, limitation of movement, sensory tricks etc. father had similar head tilt) hand tremor with writing ? writers cramp (again no pain, posturing , no response to BTX, improved with propranolol) jaw dystonia can also cause tremors, but pt has no difficulty with jaw opening, closing, talking eating or with speech.  \par \par Plan\par - Insurance approved the [place] scan will follow up to obtain\par - Continue Sinemet 25-100mg 1 tab at 8a-1p-6p. Monitor for increased drowsiness, he does not drive.\par - Continue melatonin at night for RBD\par - Continue following with pulmonary for PARMINDER\par - Monitor depressive feelings for now\par \par Follow up 3 months

## 2020-02-11 ENCOUNTER — FORM ENCOUNTER (OUTPATIENT)
Age: 70
End: 2020-02-11

## 2020-02-12 ENCOUNTER — OUTPATIENT (OUTPATIENT)
Dept: OUTPATIENT SERVICES | Facility: HOSPITAL | Age: 70
LOS: 1 days | End: 2020-02-12

## 2020-02-12 ENCOUNTER — APPOINTMENT (OUTPATIENT)
Dept: NUCLEAR MEDICINE | Facility: HOSPITAL | Age: 70
End: 2020-02-12
Payer: MEDICARE

## 2020-02-12 DIAGNOSIS — Z98.890 OTHER SPECIFIED POSTPROCEDURAL STATES: Chronic | ICD-10-CM

## 2020-02-12 DIAGNOSIS — G20 PARKINSON'S DISEASE: ICD-10-CM

## 2020-02-12 DIAGNOSIS — I83.90 ASYMPTOMATIC VARICOSE VEINS OF UNSPECIFIED LOWER EXTREMITY: Chronic | ICD-10-CM

## 2020-02-12 PROCEDURE — 78803 RP LOCLZJ TUM SPECT 1 AREA: CPT | Mod: 26

## 2020-03-10 ENCOUNTER — APPOINTMENT (OUTPATIENT)
Dept: NEUROLOGY | Facility: CLINIC | Age: 70
End: 2020-03-10

## 2020-05-05 ENCOUNTER — APPOINTMENT (OUTPATIENT)
Dept: NEUROLOGY | Facility: CLINIC | Age: 70
End: 2020-05-05
Payer: MEDICARE

## 2020-05-05 PROCEDURE — 99214 OFFICE O/P EST MOD 30 MIN: CPT | Mod: 95

## 2020-05-05 NOTE — DISCUSSION/SUMMARY
[FreeTextEntry1] : This is a 69-year-old male who presents for follow up of jaw movement and slowed walking- concern for Parkinson's disease. On exam he has a jaw tremor. Grade 1 bradykinesia with finger tapping on the right, reduced facial expression. He also has REM behavior disorder that has improved with melatonin. He endorses symptoms of depression. Cognitive changes, night time drooling, poor appetite with 3-4 lbs wt loss, urinary frequency, orthostatic hypotension (not currently). \par \par He is noted to have tilting of his head to the left shoulder he states this is present since grade 12 and has not changed. He has history of NPH status post  shunt\par \par Impression: NPH. Patient has had improved jaw tremor and walking since starting Sinemet. Cannot rule out Parkinson's disease. Mild bradykinesia with finger tapping on the right. Hand tremors are controlled with propranolol. Possibility of Dystonia was entertained  - has neck tilting (however no pain, limitation of movement, sensory tricks etc. father had similar head tilt) hand tremor with writing ? writers cramp (again no pain, posturing , no response to BTX, improved with propranolol) jaw dystonia can also cause tremors, but pt has no difficulty with jaw opening, closing, talking eating or with speech.  Dusty Scan Normal\par \par Plan\par -Dusty scan results discussed\par -urged to discuss low BP readings with PCP\par - since he notices less tremor with propralnolol and no difference with Sinemet, will slowly taper off. monitor changes\par - Continue melatonin at night for RBD\par - Continue following with pulmonary for PARMINDER\par - Monitor depressive feelings for now\par \par Follow up 3 weeks

## 2020-05-05 NOTE — PHYSICAL EXAM
[FreeTextEntry1] : Patient is awake and alert. He is pleasant cooperative with the exam.  Extraocular movements are intact. Face is symmetric.\par Facial expression is slightly reduced especially  blink rate. Tilting of the head to the left shoulder is seen, variable severity. No pain or limitation of neck movement. intermittent jaw tremor is present. No head tremor.  No tongue tremor is present. No resting action or postural tremors are seen.

## 2020-05-05 NOTE — HISTORY OF PRESENT ILLNESS
[Home] : at home, [unfilled] , at the time of the visit. [Other Location: e.g. Home (Enter Location, City,State)___] : at [unfilled] [Spouse] : spouse [Patient] : the patient [FreeTextEntry1] : This is a 69-year-old ambidextrous male who presents with his wife Lelia. Patient is here to rule out Parkinson's disease. He has been noticing facial twitching.\par \par Patient states that he has 35+ years history of essential tremor which is pretty well controlled with propanolol. He has recently increased the dose to 40 mg in the morning and 20 mg in the evening. About 2 years ago in 2017 he was noted to have difficulty with walking increased urinary frequency which did get better with BPH medications. He underwent  shunting for normal pressure hydrocephalus. He states initially he had very good response however for about a month now he is noticing that his walking has become slower and. He may shuffle. He has not had any falls. He is also noticing some twitching around his face. And memory is not as good-  he is having difficulty keeping track of dates and times. No hallucinations. has REM behavior disorder. He has sleep apnea for which he uses a dental guard and follows with a pulmonologist.\par \par Mild memory changes as above. REM behavior disorder. Anosmia for about a year. No headaches no change in vision no difficulty swallowing some drooling is present especially at nighttime he has chronic constipation. No difficulty with control of urine. No difficulty controlling blood pressure.\par \par Review of systems a complete review of systems is performed and is negative except as listed in history of present illness\par \par Family history: brother had subarachnoid hemorrhage secondary to aneurysm \par father passed away at the age of 66. \par Mother lived to be in her late 90s. \par No family history of tremors or Parkinson's disease.\par \par Past medical history\par NPH status post  shunt \par diabetes mellitus type 2 \par obstructive sleep apnea \par essential tremor\par High cholesterol\par Gingivitis\par Cardiac catheterization\par \par Current medications\par Propranolol\par Lipitor\par Metformin \par Flomax\par \par interval history: \par Since the last visit, his mouth tremor frequency and intensity has improved - not sure if d/t sinemet as he has increased propranolol to 60 - 20- 20. He feels propranolol helped more. wife and son felt sinemet improved walking. No falls. Bp has been low 90/60 sometimes, but denies any lightheadedness/palpitations.\par \par

## 2020-05-26 ENCOUNTER — APPOINTMENT (OUTPATIENT)
Dept: NEUROLOGY | Facility: CLINIC | Age: 70
End: 2020-05-26
Payer: MEDICARE

## 2020-05-26 PROCEDURE — 99214 OFFICE O/P EST MOD 30 MIN: CPT | Mod: 95

## 2020-05-26 NOTE — HISTORY OF PRESENT ILLNESS
[Home] : at home, [unfilled] , at the time of the visit. [Other Location: e.g. Home (Enter Location, City,State)___] : at [unfilled] [Spouse] : spouse [FreeTextEntry1] : This is a 69-year-old ambidextrous male who presents with his wife Lelia. Patient is here to rule out Parkinson's disease. He has been noticing facial twitching.\par \par Patient states that he has 35+ years history of essential tremor which is pretty well controlled with propanolol. He has recently increased the dose to 40 mg in the morning and 20 mg in the evening. About 2 years ago in 2017 he was noted to have difficulty with walking increased urinary frequency which did get better with BPH medications. He underwent  shunting for normal pressure hydrocephalus. He states initially he had very good response however for about a month now he is noticing that his walking has become slower and. He may shuffle. He has not had any falls. He is also noticing some twitching around his face. And memory is not as good-  he is having difficulty keeping track of dates and times. No hallucinations. has REM behavior disorder. He has sleep apnea for which he uses a dental guard and follows with a pulmonologist.\par \par Mild memory changes as above. REM behavior disorder. Anosmia for about a year. No headaches no change in vision no difficulty swallowing some drooling is present especially at nighttime he has chronic constipation. No difficulty with control of urine. No difficulty controlling blood pressure.\par \par Review of systems a complete review of systems is performed and is negative except as listed in history of present illness\par \par Family history: brother had subarachnoid hemorrhage secondary to aneurysm \par father passed away at the age of 66. \par Mother lived to be in her late 90s. \par No family history of tremors or Parkinson's disease.\par \par Past medical history\par NPH status post  shunt \par diabetes mellitus type 2 \par obstructive sleep apnea \par essential tremor\par High cholesterol\par Gingivitis\par Cardiac catheterization\par \par Current medications\par Propranolol\par Lipitor\par Metformin \par Flomax\par \par interval history: \par stopped sinemet. no difference, less sleepy, but no change in tremor, walking etc. BP has been sl better 90/60 last visit, but now 110/70. on his own had increased propranolol to 60-20-20, helps with tremor. asked to let PCP know . also reports bad taste in mouth and poor appetite\par \par  [Verbal consent obtained from patient] : the patient, [unfilled]

## 2020-05-26 NOTE — DISCUSSION/SUMMARY
[FreeTextEntry1] : This is a 69-year-old male who presents for follow up of jaw movement and slowed walking- concern for Parkinson's disease. On exam he has a jaw tremor. Grade 1 bradykinesia with finger tapping on the right, reduced facial expression. He also has REM behavior disorder that has improved with melatonin. He endorses symptoms of depression. Cognitive changes, night time drooling, poor appetite with 3-4 lbs wt loss, urinary frequency, orthostatic hypotension (not currently). \par \par He is noted to have tilting of his head to the left shoulder he states this is present since grade 12 and has not changed. He has history of NPH status post  shunt\par \par Impression: NPH. Patient has had improved jaw tremor and walking since starting Sinemet. Cannot rule out Parkinson's disease. Mild bradykinesia with finger tapping on the right. Hand tremors are controlled with propranolol. Possibility of Dystonia was entertained  - has neck tilting (however no pain, limitation of movement, sensory tricks etc. father had similar head tilt) hand tremor with writing ? writers cramp (again no pain, posturing , no response to BTX, improved with propranolol) jaw dystonia can also cause tremors, but pt has no difficulty with jaw opening, closing, talking eating or with speech.  Dusty Scan Normal, no response to sinemet 25/100 1 tab tid (made him sleepy, BP sl improved on stopping)\par \par Plan\par -urged to discuss low BP readings with PCP, sl improvement on stopping sinemet stil on propranolol 60--20-20 , which helps tremors. \par also reports low appetite and bad taste in mouth - adviced to d/w PCP\par - Continue melatonin at night for RBD\par - Continue following with pulmonary for PARMINDER\par - Monitor depressive feelings for now, discussed mirtazapine, not started today (reduced appetite, but feels sleepy during daytime in past, better with stopping sinemet)\par \par Follow up 3 months

## 2020-05-26 NOTE — PHYSICAL EXAM
[FreeTextEntry1] : Patient is awake and alert. He is pleasant cooperative with the exam.  Extraocular movements are intact. Face is symmetric.\par Facial expression is slightly reduced especially  blink rate. Tilting of the head to the left shoulder is seen, variable severity. No pain or limitation of neck movement. intermittent jaw tremor is present. No head tremor.  No tongue tremor is present. No resting action or postural tremors are seen. \par /70

## 2020-06-18 ENCOUNTER — APPOINTMENT (OUTPATIENT)
Dept: NEUROSURGERY | Facility: CLINIC | Age: 70
End: 2020-06-18
Payer: MEDICARE

## 2020-06-18 PROCEDURE — 99214 OFFICE O/P EST MOD 30 MIN: CPT | Mod: 95

## 2020-06-18 NOTE — HISTORY OF PRESENT ILLNESS
[Home] : at home, [unfilled] , at the time of the visit. [Medical Office: ([facility])___] : at the medical office located in  [Other:____] : [unfilled] [Spouse] : spouse [Verbal consent obtained from patient] : the patient, [unfilled] [FreeTextEntry1] : JOSELO MADDOX is a 69-year-old male with PMH of  DM2, essential tremors, BPH, Sleep Apnea, and NPH is doing telehealth today on 6/18/20 as his two year follow up on his NPH. He had undergone a right ventriculoperitoneal shunt with a Codman Certas valve set at 4 and a laparoscopic insertion of the abdominal end on 7/20/2018. He is following up with Dr. Walton of Neurology and is on Propranolol for his tremors. He had a CT head done in 6 /2019 and was found to have small ventricles. \par \par Today he presented for telehealth with his wife reporting that for months he is noticing his walking has become better,  he has not had any falls. He does not use any support to walk. He thinks his memory is not that great and that he loses track of times and dates. He has no bladder or bowel issues.Denies headache, seizure (never), confusion, visual/smell/taste/hearing disturbance, gait disturbance. He has sleep apnea for which he uses a dental guard and follows with a pulmonologist. Last month he had a follow up with his neurologist for his tremors and jaw movement, he was  advised to stop Sinemet and continue on Propranolol 20 mg TID.  His SUNDEEP scan was not suggestive of Parkinson's disease.\par

## 2020-06-18 NOTE — CONSULT LETTER
[Dear  ___] : Dear  [unfilled], [Consult Letter:] : I had the pleasure of evaluating your patient, [unfilled]. [Please see my note below.] : Please see my note below. [Consult Closing:] : Thank you very much for allowing me to participate in the care of this patient.  If you have any questions, please do not hesitate to contact me. [Sincerely,] : Sincerely, [FreeTextEntry2] : Diana Walton MD\par Department of Neurology [FreeTextEntry3] : Peter Weldon MD, FACS, FAANS\par Professor and \par Department of Neurosurgery\par [facility]\par [address] [place] [address], [address] [place] [address]\[place] [address]\par [phone] Clinical\par [phone] Academic\par

## 2020-06-18 NOTE — ASSESSMENT
[FreeTextEntry1] : Telehealth from 11:30 to 11:55 am (25 min)\par \par IMPRESSION:\par 70 yrs old with NPH and almost 2 years s/p  shunt at setting @ 6 \par Doing well with no significant complaints other than mild cognitive issues - excellent outcome\par Essential tremor - on treatment with propranolol per Dr iDana Walton\par \par PLAN:\par 1.Continue following up with Dr. Walton for essential tremors \par 2: Continue taking Propranolol during the day when he is active.\par 3. F/U in 1 year and sooner if clinically indicated.\par 4. No need for imaging at the present time\par \par Peter Weldon MD, FACS, FAANS\par Professor and \par Department of Neurosurgery\par [facility]\par [address] [facility], [address] [place] [address]\[place] [address]\par [phone] Clinical\par [phone] Academic\par \par

## 2020-06-18 NOTE — PHYSICAL EXAM
[General Appearance - Alert] : alert [General Appearance - Well Nourished] : well nourished [General Appearance - In No Acute Distress] : in no acute distress [General Appearance - Well-Appearing] : healthy appearing [Impaired Insight] : insight and judgment were intact [Oriented To Time, Place, And Person] : oriented to person, place, and time [Memory Recent] : recent memory was not impaired [Affect] : the affect was normal [Person] : oriented to person [Place] : oriented to place [Balance] : balance was intact [Abnormal Walk] : normal gait [Time] : oriented to time [FreeTextEntry8] : Walks well and briskly without any support. Cannot tandem walk.

## 2020-09-11 ENCOUNTER — APPOINTMENT (OUTPATIENT)
Dept: NEUROLOGY | Facility: CLINIC | Age: 70
End: 2020-09-11
Payer: MEDICARE

## 2020-09-11 VITALS
BODY MASS INDEX: 23.82 KG/M2 | HEIGHT: 65 IN | DIASTOLIC BLOOD PRESSURE: 60 MMHG | WEIGHT: 143 LBS | HEART RATE: 71 BPM | SYSTOLIC BLOOD PRESSURE: 90 MMHG

## 2020-09-11 VITALS — TEMPERATURE: 96.6 F

## 2020-09-11 PROCEDURE — 99214 OFFICE O/P EST MOD 30 MIN: CPT

## 2020-09-11 NOTE — DISCUSSION/SUMMARY
[FreeTextEntry1] : This is a 69-year-old male who presents for follow up of jaw movement and slowed walking- concern for Parkinson's disease. On exam he has a jaw tremor. Grade 1 bradykinesia with finger tapping on the right, reduced facial expression. He also has REM behavior disorder that has improved with melatonin. He endorses symptoms of depression. Cognitive changes, night time drooling, poor appetite with 3-4 lbs wt loss, urinary frequency, orthostatic hypotension (not currently). \par \par He is noted to have tilting of his head to the left shoulder he states this is present since grade 12 and has not changed. He has history of NPH status post  shunt. SUNDEEP scan neg for PD, no response to sinemet. ET better with propranolol, reports some lightheadedness, low BP and depression.\par \par Impression: NPH.ET, unclear etiology of jaw tremor\par \par Plan\par given low BP and good tremor control can consider reducing propranolol from 80--60 per day. will continue to follow closely with PCP and with me as needed\par consider mirtazapine/psychiatry evaluation for mood changes.\par all questions addressed and answered

## 2020-09-11 NOTE — HISTORY OF PRESENT ILLNESS
[FreeTextEntry1] : This is a 69-year-old ambidextrous male who presents with his wife Lelia. Patient is here to rule out Parkinson's disease. He has been noticing facial twitching.\par \par Patient states that he has 35+ years history of essential tremor which is pretty well controlled with propanolol. He has recently increased the dose to 40 mg in the morning and 20 mg in the evening. About 2 years ago in 2017 he was noted to have difficulty with walking increased urinary frequency which did get better with BPH medications. He underwent  shunting for normal pressure hydrocephalus. He states initially he had very good response however for about a month now he is noticing that his walking has become slower and. He may shuffle. He has not had any falls. He is also noticing some twitching around his face. And memory is not as good-  he is having difficulty keeping track of dates and times. No hallucinations. has REM behavior disorder. He has sleep apnea for which he uses a dental guard and follows with a pulmonologist.\par \par Mild memory changes as above. REM behavior disorder. Anosmia for about a year. No headaches no change in vision no difficulty swallowing some drooling is present especially at nighttime he has chronic constipation. No difficulty with control of urine. No difficulty controlling blood pressure.\par \par Review of systems a complete review of systems is performed and is negative except as listed in history of present illness\par \par Family history: brother had subarachnoid hemorrhage secondary to aneurysm \par father passed away at the age of 66. \par Mother lived to be in her late 90s. \par No family history of tremors or Parkinson's disease.\par \par Past medical history\par NPH status post  shunt \par diabetes mellitus type 2 \par obstructive sleep apnea \par essential tremor\par High cholesterol\par Gingivitis\par Cardiac catheterization\par \par Current medications\par Propranolol\par Lipitor\par Metformin \par Flomax\par \par interval history: \par Since the last visit,unchanged. off sinemet no difference in tremor/ movement, no falls. ET is well controlled. reduced propranolol to 40bid. reports some light headed ness on standing up, depression. lack of taste and appetite. PCP suggested mirtazapine, not taken yet\par \par

## 2020-09-11 NOTE — PHYSICAL EXAM
[FreeTextEntry1] : Patient is awake and alert. He is pleasant cooperative with the exam.  Extraocular movements are intact. Face is symmetric.\par Facial expression is slightly reduced especially  blink rate. Tilting of the head to the left shoulder is seen, variable severity. No pain or limitation of neck movement. jaw tremor is present. No head tremor.  No tongue tremor is present. No resting action or postural tremors are seen. Mild intention tremors. Opening closing hands and rapid alternating movements are normal. Patient has slight reduction of finger taps on the right, 1, normal on the left. leg agility is normal. Tone is R 1, L 0. No dysmetria. No difficulty getting up from the chair gait. Stooped posture. Noted to be slightly wide-based with reduced step height. Good arm swing bilaterally.

## 2020-11-09 ENCOUNTER — APPOINTMENT (OUTPATIENT)
Dept: PHARMACY | Facility: CLINIC | Age: 70
End: 2020-11-09
Payer: SELF-PAY

## 2020-11-09 PROCEDURE — V5299A: CUSTOM | Mod: NC

## 2021-02-23 ENCOUNTER — APPOINTMENT (OUTPATIENT)
Dept: PULMONOLOGY | Facility: CLINIC | Age: 71
End: 2021-02-23
Payer: MEDICARE

## 2021-02-23 VITALS
TEMPERATURE: 98.2 F | OXYGEN SATURATION: 98 % | SYSTOLIC BLOOD PRESSURE: 101 MMHG | HEART RATE: 89 BPM | DIASTOLIC BLOOD PRESSURE: 66 MMHG

## 2021-02-23 PROCEDURE — 99072 ADDL SUPL MATRL&STAF TM PHE: CPT

## 2021-02-23 PROCEDURE — 99213 OFFICE O/P EST LOW 20 MIN: CPT

## 2021-02-23 NOTE — ASSESSMENT
[FreeTextEntry1] : Will give patient trial of CPAP.  Will investigate if a new sleep study is required\par Follow up for data download and compliance assessment.\par

## 2021-02-23 NOTE — HISTORY OF PRESENT ILLNESS
[TextBox_4] : History of mild obstructive sleep apnea.  Last sleep study 2019 was using oral appliance but unable to use now because of tremor is interested in trying CPAP.  No change in overall health or medications

## 2021-03-08 NOTE — PHYSICAL THERAPY INITIAL EVALUATION ADULT - BALANCE TRAINING, PT EVAL
GOAL: Pt will improve static/dynamic standing balance by 1/2 grade to improve level of independence with mobility skills and ADLs in 2 weeks. Niacinamide Counseling: I recommended taking niacin or niacinamide, also know as vitamin B3, twice daily. Recent evidence suggests that taking vitamin B3 (500 mg twice daily) can reduce the risk of actinic keratoses and non-melanoma skin cancers. Side effects of vitamin B3 include flushing and headache.

## 2021-03-23 ENCOUNTER — APPOINTMENT (OUTPATIENT)
Dept: PULMONOLOGY | Facility: CLINIC | Age: 71
End: 2021-03-23
Payer: MEDICARE

## 2021-03-23 PROCEDURE — 99212 OFFICE O/P EST SF 10 MIN: CPT | Mod: 95

## 2021-03-30 NOTE — REASON FOR VISIT
[Home] : at home, [unfilled] , at the time of the visit. [Medical Office: ([facility])___] : at the medical office located in  [Verbal consent obtained from patient] : the patient, [unfilled]

## 2021-03-30 NOTE — ASSESSMENT
[FreeTextEntry1] : Initially appears very successful with CPAP.  Has in office appointment scheduled for formal compliance assessment

## 2021-03-30 NOTE — PROCEDURE
[FreeTextEntry1] : Usage days 7/7 days (100%)\par >= 4 hours 5 days (71%)\par < 4 hours 2 days (29%)\par Usage hours 39 hours 7 minutes\par Average usage (total days) 5 hours 35 minutes\par Average usage (days used) 5 hours 35 minutes\par Median usage (days used) 6 hours 15 minutes\par Total used hours (value since last reset - 03/24/2021) 47 hours\par AirSense 10 AutoSet\par Serial number [id number]\par Mode AutoSet\par Min Pressure 5 cmH2O\par Max Pressure 10 cmH2O\par EPR Fulltime\par EPR level 3\par Response Soft\par Therapy\par Pressure - cmH2O Median: 5.6 95th percentile: 7.1 Maximum: 7.6\par Leaks - L/min Median: 25.7 95th percentile: 53.7 Maximum: 98.9\par Events per hour AI: 2.2 HI: 0.3 AHI: 2.5\par Apnea Index Central: 0.3 Obstructive: 0.2 Unknown: 1.6\par RERA Index 0.9\par Cheyne-Barragan respiration (average duration per night) 0 minutes (0%)

## 2021-03-30 NOTE — HISTORY OF PRESENT ILLNESS
[TextBox_4] : Telehealth PARMINDER followup.  Initial telehealth after receiving CPAP machine.  Tolerating well without difficulty.\par \par Device: s10 auto\par \par Vendor: [employer]\par \par Settin-10\par \par Mask:nasal\par \par Issues: None tolerating well\par

## 2021-04-14 ENCOUNTER — NON-APPOINTMENT (OUTPATIENT)
Age: 71
End: 2021-04-14

## 2021-04-20 ENCOUNTER — APPOINTMENT (OUTPATIENT)
Dept: PULMONOLOGY | Facility: CLINIC | Age: 71
End: 2021-04-20
Payer: MEDICARE

## 2021-04-20 VITALS
TEMPERATURE: 97.8 F | HEIGHT: 65 IN | OXYGEN SATURATION: 95 % | BODY MASS INDEX: 24.32 KG/M2 | DIASTOLIC BLOOD PRESSURE: 67 MMHG | HEART RATE: 87 BPM | SYSTOLIC BLOOD PRESSURE: 100 MMHG | WEIGHT: 146 LBS

## 2021-04-20 PROCEDURE — 99213 OFFICE O/P EST LOW 20 MIN: CPT

## 2021-04-20 PROCEDURE — 99072 ADDL SUPL MATRL&STAF TM PHE: CPT

## 2021-04-20 NOTE — PROCEDURE
[FreeTextEntry1] : Usage days 28/30 days (93%)\par >= 4 hours 16 days (53%)\par < 4 hours 12 days (40%)\par Usage hours 138 hours 11 minutes\par Average usage (total days) 4 hours 36 minutes\par Average usage (days used) 4 hours 56 minutes\par Median usage (days used) 4 hours 47 minutes\par Total used hours (value since last reset - 04/19/2021) 162 hours\par AirSense 10 AutoSet\par Serial number [id number]\par Mode AutoSet\par Min Pressure 5 cmH2O\par Max Pressure 10 cmH2O\par EPR Fulltime\par EPR level 3\par Response Soft\par Therapy\par Pressure - cmH2O Median: 6.0 95th percentile: 8.0 Maximum: 8.7\par Leaks - L/min Median: 4.6 95th percentile: 24.1 Maximum: 66.8\par Events per hour AI: 1.6 HI: 0.4 AHI: 2.0\par Apnea Index Central: 0.2 Obstructive: 0.6 Unknown: 0.8\par RERA Index 0.4

## 2021-04-20 NOTE — HISTORY OF PRESENT ILLNESS
[TextBox_4] : Here for PARMINDER followup. Reports no current respiratory symptoms or sleep symptoms. Using PAP on a nightly basis without difficulty. Reports no symptoms of daytime sleepiness. Getting supplies regularly. \par \par Device: s10 auto\par \par Vendor: adapt\par \par Settin-10\par \par Mask:nasal\par \par Issues:takes mask off at night without realizing it\par Much less sleepy by day\par

## 2021-04-20 NOTE — ASSESSMENT
[FreeTextEntry1] : Patient is currently on treatment with PAP. Data download confirms excellent compliance. Patient continues to use treatment and is benefiting from it.\par change to fixed pressure 8 cm\par data check 2 weeks

## 2021-07-13 ENCOUNTER — APPOINTMENT (OUTPATIENT)
Dept: PULMONOLOGY | Facility: CLINIC | Age: 71
End: 2021-07-13
Payer: MEDICARE

## 2021-07-13 VITALS
DIASTOLIC BLOOD PRESSURE: 64 MMHG | TEMPERATURE: 97.8 F | HEART RATE: 74 BPM | OXYGEN SATURATION: 98 % | SYSTOLIC BLOOD PRESSURE: 99 MMHG

## 2021-07-13 PROCEDURE — 99072 ADDL SUPL MATRL&STAF TM PHE: CPT

## 2021-07-13 PROCEDURE — 99213 OFFICE O/P EST LOW 20 MIN: CPT

## 2021-07-14 NOTE — ASSESSMENT
[FreeTextEntry1] : Compliance somewhat suboptimal.  Main issue is that he wakes up at night to urinate and then does not put his mask back on.  We discussed using a quick disconnect to allow him to just disconnect the machine from the mask allowing her to get back and forth to the bathroom and then reconnect more easily hopefully this should lead to better compliance.\par

## 2021-07-14 NOTE — HISTORY OF PRESENT ILLNESS
[TextBox_4] : Follow-up for sleep apnea no interval complaints.  Using CPAP on a nightly basis without difficulty reports no daytime sleepiness

## 2021-07-14 NOTE — PROCEDURE
[FreeTextEntry1] : Usage days 67/85 days (79%) >= 4 hours 38 days (45%) < 4 hours 29 days (34%) Usage hours 298 hours 27 minutes Average usage (total days) 3 hours 31 minutes Average usage (days used) 4 hours 27 minutes Median usage (days used) 4 hours 30 minutes Total used hours (value since last reset - 07/12/2021) 456 hours AirSense 10 AutoSet Serial number [id number] Mode CPAP Set pressure 8 cmH2O EPR Fulltime EPR level 3 Therapy Leaks - L/min Median: 7.1 95th percentile: 27.8 Maximum: 53.4 Events per hour AI: 1.6 HI: 0.6 AHI: 2.2 Apnea Index Central: 0.2 Obstructive: 0.5 Unknown: 1.0 RERA Index 0.6

## 2021-07-15 ENCOUNTER — APPOINTMENT (OUTPATIENT)
Dept: NEUROSURGERY | Facility: CLINIC | Age: 71
End: 2021-07-15
Payer: MEDICARE

## 2021-07-15 PROCEDURE — 62252 CSF SHUNT REPROGRAM: CPT

## 2021-07-15 PROCEDURE — 99072 ADDL SUPL MATRL&STAF TM PHE: CPT

## 2021-07-15 PROCEDURE — 99214 OFFICE O/P EST MOD 30 MIN: CPT

## 2021-07-15 NOTE — REASON FOR VISIT
[Follow-Up: _____] : a [unfilled] follow-up visit [Family Member] : family member [Spouse] : spouse [FreeTextEntry1] : NPH

## 2021-07-15 NOTE — PHYSICAL EXAM
[General Appearance - Alert] : alert [General Appearance - In No Acute Distress] : in no acute distress [General Appearance - Well Nourished] : well nourished [General Appearance - Well-Appearing] : healthy appearing [Oriented To Time, Place, And Person] : oriented to person, place, and time [Impaired Insight] : insight and judgment were intact [Affect] : the affect was normal [Memory Recent] : recent memory was not impaired [Person] : oriented to person [Place] : oriented to place [Time] : oriented to time [Short Term Intact] : short term memory intact [Cranial Nerves Optic (II)] : visual acuity intact bilaterally,  pupils equal round and reactive to light [Cranial Nerves Oculomotor (III)] : extraocular motion intact [Cranial Nerves Trigeminal (V)] : facial sensation intact symmetrically [Cranial Nerves Facial (VII)] : face symmetrical [Cranial Nerves Vestibulocochlear (VIII)] : hearing was intact bilaterally [Cranial Nerves Glossopharyngeal (IX)] : tongue and palate midline [Cranial Nerves Accessory (XI - Cranial And Spinal)] : head turning and shoulder shrug symmetric [Cranial Nerves Hypoglossal (XII)] : there was no tongue deviation with protrusion [Motor Tone] : muscle tone was normal in all four extremities [Motor Strength] : muscle strength was normal in all four extremities [Involuntary Movements] : no involuntary movements were seen [Sensation Tactile Decrease] : light touch was intact [Sensation Pain / Temperature Decrease] : pain and temperature was intact [Sclera] : the sclera and conjunctiva were normal [PERRL With Normal Accommodation] : pupils were equal in size, round, reactive to light, with normal accommodation [Outer Ear] : the ears and nose were normal in appearance [Both Tympanic Membranes Were Examined] : both tympanic membranes were normal [Neck Appearance] : the appearance of the neck was normal [] : no respiratory distress [Respiration, Rhythm And Depth] : normal respiratory rhythm and effort [Apical Impulse] : the apical impulse was normal [Heart Rate And Rhythm] : heart rate was normal and rhythm regular [Arterial Pulses Carotid] : carotid pulses were normal with no bruits [Abdominal Aorta] : the abdominal aorta was normal [Bowel Sounds] : normal bowel sounds [Abdomen Soft] : soft [No CVA Tenderness] : no ~M costovertebral angle tenderness [Abnormal Walk] : normal gait [Musculoskeletal - Swelling] : no joint swelling seen [Skin Color & Pigmentation] : normal skin color and pigmentation [Skin Turgor] : normal skin turgor [FreeTextEntry1] : Recalled 2/3 objects at 3 mins. [2+] : Ankle jerk left 2+ [FreeTextEntry5] : Essential tremor of the chin + [FreeTextEntry8] : Walk somewhat slowly with mild shuffling gait with 2 steps at turns. Has never been able to tandem walk.

## 2021-07-15 NOTE — CONSULT LETTER
[Dear  ___] : Dear ~SHAWNA, [Courtesy Letter:] : I had the pleasure of seeing your patient, [unfilled], in my office today. [Please see my note below.] : Please see my note below. [Consult Closing:] : Thank you very much for allowing me to participate in the care of this patient.  If you have any questions, please do not hesitate to contact me. [Sincerely,] : Sincerely, [FreeTextEntry2] : Diana Walton MD\par [address]\par [place], [place] [FreeTextEntry3] : Peter Weldon MD, FACS, FAANS\par Professor and \par Department of Neurosurgery\par [facility]\par [address]\[place] [address]\par Phone [phone]\par Email: [email or web address]\par \par

## 2021-07-15 NOTE — ASSESSMENT
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 \par \par Peter Weldon MD, FACS, FAANS\par Professor and \par Department of Neurosurgery\par [facility] at [facility]\par [address] [place]\[place] [address]\par Phone [phone]\par Email: [email or web address]\par \par \par \par

## 2021-07-15 NOTE — HISTORY OF PRESENT ILLNESS
[FreeTextEntry1] : JOSELO MADDOX is a 69-year-old gentleman of  origin with PMH of DM2, essential tremors, BPH, Sleep Apnea, and NPH is here for a 3-year follow up for his NPH. He underwent a right ventriculoperitoneal shunt with a Codman Certas valve set at 6 and a laparoscopic insertion of the abdominal end on 7/20/2018. He has been following up with Dr. Walton of Neurology and has been on Propranolol for essential tremors for years (80mg /day). He had a CT head done in 6 /2019 and was found to have normal-sized ventricles. \par \par Today he returns for a follow up. Pt reports that for past few months  his walking is somewhat worse. He has had a couple falls recently.  He thinks his memory is not that great and that he loses track of times and dates. He has no bladder or bowel issues. Denies headache, seizures (never), confusion, visual/smell/taste/hearing disturbance. Family reports some depression, cognitive changes and  and behavioral issues.  Pt was recently followed up with a movement disorder Neurologist Adenike Mitchell MD at [facility] who (as per their son) did not think there was any other new problem and ordered PT and a CT head (CT head not yet done). \par \par Pt following up pulmonologist for sleep apnea . He is on Propranolol 80 mg /days for tremors/ Parkinsons  managed by Neurologist Diana Walton MD.\par \par Today his  shunt depresses and refills redly. Valve checked and confirmed at 6.  TUG test is 12 today.

## 2021-08-17 ENCOUNTER — APPOINTMENT (OUTPATIENT)
Dept: NEUROSURGERY | Facility: CLINIC | Age: 71
End: 2021-08-17

## 2021-08-17 NOTE — HISTORY OF PRESENT ILLNESS
[FreeTextEntry1] : JOSELO MADDOX is a 69-year-old gentleman of  origin with PMH of DM2, essential tremors, BPH, Sleep Apnea, and NPH is here for a 3-year follow up for his NPH. He underwent a right ventriculoperitoneal shunt with a Codman Certas valve set at 6 and a laparoscopic insertion of the abdominal end on 7/20/2018. He has been following up with Dr. Walton of Neurology and has been on Propranolol for essential tremors for years (80 mg /day). He had a CT head done in 6 /2019 and was found to have normal-sized ventricles. \par \par He returned for a follow up on 7/15/21 with stating  his walking is somewhat worse and had  couple falls. Family also reported some depression, cognitive changes and and behavioral issues. Pt  followed up with a movement disorder Neurologist Adenike Mitchell MD at [facility] and was  ordered PT and a CT head (CT head not yet done). Shunt re programmed to 5 on 7/15/21.

## 2021-08-17 NOTE — REASON FOR VISIT
Patient fell 1 week ago tripping over a chair. States he felt fine initially but as time went on he got more sore. He did not seek treatment after the fall. He reports that his ribs hurt when he moves his arms and that it hurts to breath. States when he coughs it hurts and is harder to breathe. He requests an appointment tomorrow.     He was encouraged to seek care asap at urgent care at [facility]. Informed breathing problems should not wait. Reminded of the friged windchills expected tomorrow. States he was trying to avoid the 50 dollar co-pay by scheduling an office visit. He states he is working until 1730 tonight and will have his wife bring him to  afterward. He was encouraged to go sooner if able.    Pt stated he fell last week and now has back and rib pain.  Pt is also experiencing pain when he breathes.    Pt requested to be seen tomorrow if possible.    Please call to discuss.   [Follow-Up: _____] : a [unfilled] follow-up visit [Spouse] : spouse [FreeTextEntry1] : NPH with  shunt 36.4

## 2021-08-31 ENCOUNTER — NON-APPOINTMENT (OUTPATIENT)
Age: 71
End: 2021-08-31

## 2021-09-07 ENCOUNTER — APPOINTMENT (OUTPATIENT)
Dept: NEUROSURGERY | Facility: CLINIC | Age: 71
End: 2021-09-07
Payer: MEDICARE

## 2021-09-07 PROCEDURE — 62252 CSF SHUNT REPROGRAM: CPT

## 2021-09-07 PROCEDURE — 99214 OFFICE O/P EST MOD 30 MIN: CPT | Mod: 25

## 2021-09-07 NOTE — CONSULT LETTER
[Dear  ___] : Dear ~SHAWNA, [Please see my note below.] : Please see my note below. [Consult Closing:] : Thank you very much for allowing me to participate in the care of this patient.  If you have any questions, please do not hesitate to contact me. [Sincerely,] : Sincerely, [FreeTextEntry2] : Diana Walton MD\par [address]\[place] [address] [FreeTextEntry3] : Peter Weldon MD, FACS, FAANS\par Professor and \par Department of Neurosurgery\par [facility]\par [address]\[place] [address]\par Phone [phone]\par Email: [email or web address]\par \par

## 2021-09-07 NOTE — HISTORY OF PRESENT ILLNESS
[FreeTextEntry1] : JOSELO MADDOX is a 69-year-old gentleman of  origin with PMH of DM2, essential tremors, BPH, Sleep Apnea, and NPH is here for a 3-year follow up for NPH. He underwent a right ventriculoperitoneal shunt with a Codman Certas valve set at 6 and a laparoscopic insertion of the abdominal end on 7/20/2018. He has been following up with Dr. Walton of Neurology for tremors and has been on Propranolol for essential tremors for many years (80 mg /day). He had a CT head done in 6 /2019 and was found to have normal-sized ventricles. \par \par He returned for a follow up on 7/15/21 stating that his walking was somewhat worse and that he had had a couple of falls. Family also reported some depression, cognitive changes and and behavioral issues. Pt had seen  another movement disorder Neurologist Adenike Mitchell MD at [facility] who ordered PT and a CT head (CT head not yet done). Shunt re programmed from 6 to 5 on 7/15/21.\par \par Pt returned today and reported an improvement in stability. His wife thinks that he is still walking slowly. Memory is the same with not much improvement. Pt continues to take Propanol for essential tremors. He is doing well otherwise. Certas valve depresses and refill readily. Setting confirmed at 5.

## 2021-09-07 NOTE — ASSESSMENT
[FreeTextEntry1] : IMPRESSION:\par Moderate improvement in gait with change in valve setting from 6 to 5 (increased CSF flow)\par Memory 3/3 at 3 minutes\par \par \par PLAN:\par 1: CT Head no contrast \par 2: Offered to change the shunt setting to 4, but pt would like to stay at 5 at this time. \par 3: F/U after CT head \par \par Peter Weldon MD, FACS, FAANS\par Professor and \par Department of Neurosurgery\par [facility] at [facility]\par [address] [place] [address]\[place] [address]\par Phone [phone]\par Email: [email or web address]\par \par \par \par \par

## 2021-09-07 NOTE — PHYSICAL EXAM
[General Appearance - Alert] : alert [General Appearance - In No Acute Distress] : in no acute distress [General Appearance - Well Nourished] : well nourished [General Appearance - Well-Appearing] : healthy appearing [Oriented To Time, Place, And Person] : oriented to person, place, and time [Impaired Insight] : insight and judgment were intact [Affect] : the affect was normal [Memory Recent] : recent memory was not impaired [Person] : oriented to person [Place] : oriented to place [Time] : oriented to time [Short Term Intact] : short term memory intact [Cranial Nerves Optic (II)] : visual acuity intact bilaterally,  pupils equal round and reactive to light [Cranial Nerves Oculomotor (III)] : extraocular motion intact [Cranial Nerves Trigeminal (V)] : facial sensation intact symmetrically [Cranial Nerves Facial (VII)] : face symmetrical [Cranial Nerves Vestibulocochlear (VIII)] : hearing was intact bilaterally [Cranial Nerves Glossopharyngeal (IX)] : tongue and palate midline [Cranial Nerves Accessory (XI - Cranial And Spinal)] : head turning and shoulder shrug symmetric [Cranial Nerves Hypoglossal (XII)] : there was no tongue deviation with protrusion [Motor Tone] : muscle tone was normal in all four extremities [Motor Strength] : muscle strength was normal in all four extremities [Sensation Tactile Decrease] : light touch was intact [Sensation Pain / Temperature Decrease] : pain and temperature was intact [Sclera] : the sclera and conjunctiva were normal [PERRL With Normal Accommodation] : pupils were equal in size, round, reactive to light, with normal accommodation [Outer Ear] : the ears and nose were normal in appearance [Neck Appearance] : the appearance of the neck was normal [] : no respiratory distress [Respiration, Rhythm And Depth] : normal respiratory rhythm and effort [Apical Impulse] : the apical impulse was normal [Heart Rate And Rhythm] : heart rate was normal and rhythm regular [Arterial Pulses Carotid] : carotid pulses were normal with no bruits [Abdominal Aorta] : the abdominal aorta was normal [No CVA Tenderness] : no ~M costovertebral angle tenderness [Nail Clubbing] : no clubbing  or cyanosis of the fingernails [Involuntary Movements] : no involuntary movements were seen [FreeTextEntry8] : Walks and turns fairly well with good balance. TUG at 20' 16 secs. Cannot tandem walk.

## 2021-09-07 NOTE — REASON FOR VISIT
[Follow-Up: _____] : a [unfilled] follow-up visit [Spouse] : spouse [FreeTextEntry1] : Hydrocephalus with VPS x 3 years

## 2021-10-05 ENCOUNTER — APPOINTMENT (OUTPATIENT)
Dept: CT IMAGING | Facility: IMAGING CENTER | Age: 71
End: 2021-10-05
Payer: MEDICARE

## 2021-10-05 ENCOUNTER — OUTPATIENT (OUTPATIENT)
Dept: OUTPATIENT SERVICES | Facility: HOSPITAL | Age: 71
LOS: 1 days | End: 2021-10-05
Payer: MEDICARE

## 2021-10-05 DIAGNOSIS — I83.90 ASYMPTOMATIC VARICOSE VEINS OF UNSPECIFIED LOWER EXTREMITY: Chronic | ICD-10-CM

## 2021-10-05 DIAGNOSIS — Z98.890 OTHER SPECIFIED POSTPROCEDURAL STATES: Chronic | ICD-10-CM

## 2021-10-05 DIAGNOSIS — Z00.8 ENCOUNTER FOR OTHER GENERAL EXAMINATION: ICD-10-CM

## 2021-10-05 PROCEDURE — 70450 CT HEAD/BRAIN W/O DYE: CPT

## 2021-10-05 PROCEDURE — 70450 CT HEAD/BRAIN W/O DYE: CPT | Mod: 26

## 2021-10-12 ENCOUNTER — NON-APPOINTMENT (OUTPATIENT)
Age: 71
End: 2021-10-12

## 2021-10-19 ENCOUNTER — APPOINTMENT (OUTPATIENT)
Dept: PULMONOLOGY | Facility: CLINIC | Age: 71
End: 2021-10-19
Payer: MEDICARE

## 2021-10-19 VITALS
OXYGEN SATURATION: 99 % | SYSTOLIC BLOOD PRESSURE: 111 MMHG | TEMPERATURE: 97.6 F | HEART RATE: 73 BPM | DIASTOLIC BLOOD PRESSURE: 73 MMHG | RESPIRATION RATE: 15 BRPM

## 2021-10-19 PROCEDURE — 99213 OFFICE O/P EST LOW 20 MIN: CPT

## 2021-10-19 NOTE — HISTORY OF PRESENT ILLNESS
[TextBox_4] : Follow-up for sleep apnea.  Currently on S 10 auto.\par Setting CPAP 8\par Reports no daytime sleepiness\par Getting all supplies

## 2021-11-04 ENCOUNTER — APPOINTMENT (OUTPATIENT)
Dept: NEUROSURGERY | Facility: CLINIC | Age: 71
End: 2021-11-04
Payer: MEDICARE

## 2021-11-04 VITALS
HEART RATE: 77 BPM | OXYGEN SATURATION: 99 % | SYSTOLIC BLOOD PRESSURE: 121 MMHG | TEMPERATURE: 97.8 F | DIASTOLIC BLOOD PRESSURE: 73 MMHG

## 2021-11-04 PROCEDURE — 99214 OFFICE O/P EST MOD 30 MIN: CPT

## 2021-11-04 NOTE — PHYSICAL EXAM
[General Appearance - Alert] : alert [General Appearance - In No Acute Distress] : in no acute distress [General Appearance - Well-Appearing] : healthy appearing [Oriented To Time, Place, And Person] : oriented to person, place, and time [Impaired Insight] : insight and judgment were intact [Affect] : the affect was normal [Mood] : the mood was normal [Memory Recent] : recent memory was not impaired [Memory Remote] : remote memory was not impaired [Person] : oriented to person [Place] : oriented to place [Time] : oriented to time [Cranial Nerves Optic (II)] : visual acuity intact bilaterally,  pupils equal round and reactive to light [Cranial Nerves Oculomotor (III)] : extraocular motion intact [Cranial Nerves Trigeminal (V)] : facial sensation intact symmetrically [Cranial Nerves Facial (VII)] : face symmetrical [Cranial Nerves Vestibulocochlear (VIII)] : hearing was intact bilaterally [Cranial Nerves Glossopharyngeal (IX)] : tongue and palate midline [Cranial Nerves Accessory (XI - Cranial And Spinal)] : head turning and shoulder shrug symmetric [Motor Tone] : muscle tone was normal in all four extremities [Motor Strength] : muscle strength was normal in all four extremities [Involuntary Movements] : no involuntary movements were seen [Sensation Tactile Decrease] : light touch was intact [Balance] : balance was intact [Sclera] : the sclera and conjunctiva were normal [PERRL With Normal Accommodation] : pupils were equal in size, round, reactive to light, with normal accommodation [Extraocular Movements] : extraocular movements were intact [Outer Ear] : the ears and nose were normal in appearance [Hearing Threshold Finger Rub Not [place]] : hearing was normal [Neck Appearance] : the appearance of the neck was normal [Neck Cervical Mass (___cm)] : no neck mass was observed [] : no respiratory distress [Exaggerated Use Of Accessory Muscles For Inspiration] : no accessory muscle use [Edema] : there was no peripheral edema [No CVA Tenderness] : no ~M costovertebral angle tenderness [Abnormal Walk] : normal gait [Skin Color & Pigmentation] : normal skin color and pigmentation [General Appearance - Well Nourished] : well nourished [Cranial Nerves Hypoglossal (XII)] : there was no tongue deviation with protrusion [FreeTextEntry8] : Slow gait, can not to tandem walk  [FreeTextEntry1] : slow gait

## 2021-11-04 NOTE — HISTORY OF PRESENT ILLNESS
[FreeTextEntry1] : JOSELO MADDOX is a 71-year-old gentleman of  origin with PMH of DM2, essential tremors, BPH, Sleep Apnea, and NPH who underwent a right ventriculoperitoneal shunt with a Codman Certas valve set at 6 and a laparoscopic insertion of the abdominal end on 7/20/2018. He has been following up with Dr. Walton of Neurology for essential tremors and has been on Propranolol for essential tremors for many years (80 mg /day). \par \par \par On 7/15/21 patient reported that his walking was somewhat worse and that he had had a couple of falls. Family also reported some depression, cognitive changes and and behavioral issues. Pt had seen  another movement disorder Neurologist Adenike Mitchell MD at [facility] who ordered PT and a CT head. Shunt was re programmed from 6 to 5 on 7/15/21.\par \par Last visit on 9/7/21 Patient reported  improvement in balance, although still walking slowly, no change in memory. I offered to change the shunt setting to 4 but patient wanted to stay at 5. \par \par Today patient comes in with spouse and son, states that he is doing well, memory is stable, walking is the same slow without using any assistive device, no urinary frequency or incontinence. CT head completed in 10/5/21. Per wife patient has problem recalling objects otherwise everything is stable. He is able to conduct his ADLs independently. Goes for a walk every day, although he tires after a while.\par \par TUG test last visit: 16 seconds\par TUG test today: 10.8 seconds\par \par

## 2021-11-04 NOTE — ASSESSMENT
[FreeTextEntry1] : IMPRESSION:\par NPH 3 years s/p  shunt\par Moderate improvement in gait with change in valve setting from 6 to 5 (increased CSF flow), stable memory \par CT head 10/5/21: slight enlargement of 4th ventricle, not clinically significant  \par Overall clinically doing well with gait and balance\par \par PLAN:\par Follow up in 6 months with CT head without contrast\par \par Peter Weldon MD, FACS, FAANS\par Professor and \par Department of Neurosurgery\par [facility] at [facility]\par \par Clinical Offices:\par [address]\[place] [address]\par \par [address]\[place] [address]\par \par Phone [phone]\par Email: [email or web address]\par \par \par \par \par \par \par \par

## 2021-11-04 NOTE — DATA REVIEWED
[de-identified] : CT head 10/5/21: Slight increase in size of the lateral third and fourth ventricles as compared to previous CTH dated 6/10/2019. However, still much smaller than pre-op. \par \par \par

## 2021-11-24 NOTE — ASSESSMENT
[FreeTextEntry1] : IMPRESSION:\par 1. 9 months s/p right  shunt for NPH\par 2. Certas VPS setting confirmed to be set at 6\par 3. Gait steady but difficulty with tandem walking\par 4. TUG test 10 seconds (WNL)\par 5. Doubt focal seizure activity over right face\par \par PLAN:\par 1. CT brain \par 2. EEG as ordered by Dr. Sales \par 2. F/U in 1 year if the above tests are unremarkable\par \par Peter Weldon MD, FACS, FAANS\par Professor and \par Department of Neurosurgery\par [facility]\par [address] [place] [address], [address] [place] [address]\[place] [address]\par [phone] Clinical\par [phone] Academic\par \par 
3

## 2021-12-21 ENCOUNTER — APPOINTMENT (OUTPATIENT)
Dept: PULMONOLOGY | Facility: CLINIC | Age: 71
End: 2021-12-21

## 2022-01-13 ENCOUNTER — APPOINTMENT (OUTPATIENT)
Dept: PLASTIC SURGERY | Facility: CLINIC | Age: 72
End: 2022-01-13

## 2022-02-02 ENCOUNTER — APPOINTMENT (OUTPATIENT)
Dept: PHARMACY | Facility: CLINIC | Age: 72
End: 2022-02-02
Payer: SELF-PAY

## 2022-02-02 PROCEDURE — V5299A: CUSTOM | Mod: NC

## 2022-03-14 ENCOUNTER — APPOINTMENT (OUTPATIENT)
Dept: PHARMACY | Facility: CLINIC | Age: 72
End: 2022-03-14
Payer: SELF-PAY

## 2022-03-14 PROCEDURE — V5299A: CUSTOM | Mod: NC

## 2022-03-24 ENCOUNTER — APPOINTMENT (OUTPATIENT)
Dept: PLASTIC SURGERY | Facility: CLINIC | Age: 72
End: 2022-03-24

## 2022-03-24 VITALS
OXYGEN SATURATION: 100 % | HEART RATE: 68 BPM | HEIGHT: 65 IN | DIASTOLIC BLOOD PRESSURE: 72 MMHG | TEMPERATURE: 98.2 F | WEIGHT: 151 LBS | SYSTOLIC BLOOD PRESSURE: 104 MMHG | BODY MASS INDEX: 25.16 KG/M2

## 2022-03-24 PROCEDURE — 99203 OFFICE O/P NEW LOW 30 MIN: CPT

## 2022-03-29 ENCOUNTER — APPOINTMENT (OUTPATIENT)
Dept: PHARMACY | Facility: CLINIC | Age: 72
End: 2022-03-29
Payer: SELF-PAY

## 2022-03-29 PROCEDURE — V5299A: CUSTOM | Mod: NC

## 2022-04-06 ENCOUNTER — APPOINTMENT (OUTPATIENT)
Dept: PHARMACY | Facility: CLINIC | Age: 72
End: 2022-04-06
Payer: SELF-PAY

## 2022-04-06 PROCEDURE — V5299A: CUSTOM | Mod: NC

## 2022-04-12 ENCOUNTER — APPOINTMENT (OUTPATIENT)
Dept: CT IMAGING | Facility: IMAGING CENTER | Age: 72
End: 2022-04-12

## 2022-04-12 ENCOUNTER — APPOINTMENT (OUTPATIENT)
Dept: CT IMAGING | Facility: IMAGING CENTER | Age: 72
End: 2022-04-12
Payer: MEDICARE

## 2022-04-12 ENCOUNTER — OUTPATIENT (OUTPATIENT)
Dept: OUTPATIENT SERVICES | Facility: HOSPITAL | Age: 72
LOS: 1 days | End: 2022-04-12
Payer: MEDICARE

## 2022-04-12 DIAGNOSIS — Z98.890 OTHER SPECIFIED POSTPROCEDURAL STATES: Chronic | ICD-10-CM

## 2022-04-12 DIAGNOSIS — G91.9 HYDROCEPHALUS, UNSPECIFIED: ICD-10-CM

## 2022-04-12 DIAGNOSIS — I83.90 ASYMPTOMATIC VARICOSE VEINS OF UNSPECIFIED LOWER EXTREMITY: Chronic | ICD-10-CM

## 2022-04-12 PROCEDURE — 70450 CT HEAD/BRAIN W/O DYE: CPT

## 2022-04-12 PROCEDURE — 70450 CT HEAD/BRAIN W/O DYE: CPT | Mod: 26

## 2022-04-21 ENCOUNTER — APPOINTMENT (OUTPATIENT)
Dept: NEUROSURGERY | Facility: CLINIC | Age: 72
End: 2022-04-21
Payer: MEDICARE

## 2022-04-21 DIAGNOSIS — G91.9 HYDROCEPHALUS, UNSPECIFIED: ICD-10-CM

## 2022-04-21 PROCEDURE — 62252 CSF SHUNT REPROGRAM: CPT

## 2022-04-21 PROCEDURE — 99214 OFFICE O/P EST MOD 30 MIN: CPT

## 2022-04-21 NOTE — DATA REVIEWED
[de-identified] : CT head 4/12/22 compared with CT 10/5/21. The ventricles are moderately enlarged and are certainly larger than on 10/5/21. Lateral and IIIrd ventricles on 4/12/22 are 39 and 13 mm respectively, compared to 34 and 8 mm on 10/5/21. [de-identified] : C

## 2022-04-21 NOTE — ASSESSMENT
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 Stable memory \par CT head 4/12/22: Ventricles increased in size compared to CT on 10/5/21\par \par \par PLAN:\par - Shunt dialed down from 5 down to 4 today\par - Repeat CT head in one month \par - Advised patient's son and spouse to let us know sooner if there are any clinical changes with new setting\par \par \par \par Peter Weldon MD, FACS, FAANS\par Professor and \par Department of Neurosurgery\par [facility]\par \par Clinical Offices:\par [address]\[place] [address]\par \par [address]\[place] [address]\par \par Phone [phone]\par Email: [email or web address]\par \par

## 2022-04-21 NOTE — CONSULT LETTER
[Dear  ___] : Dear ~SHAWNA, [Courtesy Letter:] : I had the pleasure of seeing your patient, [unfilled], in my office today. [Please see my note below.] : Please see my note below. [Consult Closing:] : Thank you very much for allowing me to participate in the care of this patient.  If you have any questions, please do not hesitate to contact me. [Sincerely,] : Sincerely, [FreeTextEntry2] : Mary Lou Sales MD\par [address] [facility]\[place] [address] [FreeTextEntry3] : Peter Weldon MD, FACS, FAANS\par Professor and \par Department of Neurosurgery\par [facility]\par \par Clinical Offices:\par [address]\[place] [address]\par \par [address]\[place] [address]\par \par Phone [phone]\par Email: [email or web address]\par \par

## 2022-04-21 NOTE — PHYSICAL EXAM
[General Appearance - Alert] : alert [General Appearance - In No Acute Distress] : in no acute distress [General Appearance - Well Nourished] : well nourished [General Appearance - Well-Appearing] : healthy appearing [Oriented To Time, Place, And Person] : oriented to person, place, and time [Impaired Insight] : insight and judgment were intact [Mood] : the mood was normal [Affect] : the affect was normal [Memory Recent] : recent memory was not impaired [Memory Remote] : remote memory was not impaired [Person] : oriented to person [Place] : oriented to place [Time] : oriented to time [Cranial Nerves Optic (II)] : visual acuity intact bilaterally,  pupils equal round and reactive to light [Cranial Nerves Oculomotor (III)] : extraocular motion intact [Cranial Nerves Trigeminal (V)] : facial sensation intact symmetrically [Cranial Nerves Facial (VII)] : face symmetrical [Cranial Nerves Vestibulocochlear (VIII)] : hearing was intact bilaterally [Cranial Nerves Glossopharyngeal (IX)] : tongue and palate midline [Cranial Nerves Accessory (XI - Cranial And Spinal)] : head turning and shoulder shrug symmetric [Cranial Nerves Hypoglossal (XII)] : there was no tongue deviation with protrusion [Motor Tone] : muscle tone was normal in all four extremities [Motor Strength] : muscle strength was normal in all four extremities [Involuntary Movements] : no involuntary movements were seen [Sensation Tactile Decrease] : light touch was intact [Balance] : balance was intact [Sclera] : the sclera and conjunctiva were normal [PERRL With Normal Accommodation] : pupils were equal in size, round, reactive to light, with normal accommodation [Extraocular Movements] : extraocular movements were intact [Outer Ear] : the ears and nose were normal in appearance [Hearing Threshold Finger Rub Not [place]] : hearing was normal [Neck Appearance] : the appearance of the neck was normal [Neck Cervical Mass (___cm)] : no neck mass was observed [Exaggerated Use Of Accessory Muscles For Inspiration] : no accessory muscle use [] : no respiratory distress [Edema] : there was no peripheral edema [No CVA Tenderness] : no ~M costovertebral angle tenderness [Abnormal Walk] : normal gait [Skin Color & Pigmentation] : normal skin color and pigmentation [Well-Healed] : well-healed [FreeTextEntry8] : Walks well independently. Has never been able to tandem walk. TUG 9 secs [FreeTextEntry1] : slow gait

## 2022-04-21 NOTE — HISTORY OF PRESENT ILLNESS
[FreeTextEntry1] : JOSELO MADDOX is a 71-year-old gentleman of  origin with PMH of DM2, essential tremors, BPH, sleep Apnea, and NPH who underwent a right ventriculoperitoneal shunt with a Codman Certas valve set at 6 and a laparoscopic insertion of the abdominal end on 7/20/2018. He has been following up with Dr. Walton of Neurology for essential tremors and has been on Propranolol for essential tremors for many years (80 mg /day). \par \par Shunt was re programmed from 6 to 5 on 7/15/21. Last visit on 11/4/21, came in with spouse and son, stated that he was doing well, memory has been stable, walking was the same slow without using any assistive device. CT head completed on 10/5/21. Per wife patient has problem recalling objects otherwise everything was stable. He is able to conduct his ADLs independently. Goes for a walk every day, although he tires after a while.\par \par Here today with his wife and son. Patient and family report that he has been stable since the last visit, his memory and gait is stable to slightly improved than last visit. No new complaints. No headaches, dizziness or weakness. New CT head from 4/12/22 shows a slight increase in the size of the ventricles. Patient clinically doing well. \par \par \par TUG test 10.8 seconds (last visit)\par TUG test today 9 seconds \par \par \par \par

## 2022-05-05 ENCOUNTER — TRANSCRIPTION ENCOUNTER (OUTPATIENT)
Age: 72
End: 2022-05-05

## 2022-05-06 NOTE — H&P PST ADULT - NS PRO REFERRAL CMGT
Show Aperture Variable?: Yes Consent: The patient's consent was obtained including but not limited to risks of crusting, scabbing, blistering, scarring, darker or lighter pigmentary change, recurrence, incomplete removal and infection. Detail Level: Detailed Application Tool (Optional): Liquid Nitrogen Sprayer Render Note In Bullet Format When Appropriate: No Duration Of Freeze Thaw-Cycle (Seconds): 0 Post-Care Instructions: I reviewed with the patient in detail post-care instructions. Patient is to wear sunprotection, and avoid picking at any of the treated lesions. Pt may apply Vaseline to crusted or scabbing areas. None

## 2022-05-06 NOTE — PRE-ANESTHESIA EVALUATION ADULT - HEIGHT IN CM
[FreeTextEntry1] : At the previous visit and also today we discussed management of erectile dysfunction.  Another option would be to try tadalafil 5 mg daily.  A prescription was submitted.  Side effects were discussed.  He will get back to me with his response.  Intracavernosal injections were also reviewed. 165.1

## 2022-05-17 ENCOUNTER — APPOINTMENT (OUTPATIENT)
Dept: UROLOGY | Facility: CLINIC | Age: 72
End: 2022-05-17
Payer: MEDICARE

## 2022-05-17 DIAGNOSIS — R35.0 FREQUENCY OF MICTURITION: ICD-10-CM

## 2022-05-17 DIAGNOSIS — G20 PARKINSON'S DISEASE: ICD-10-CM

## 2022-05-17 DIAGNOSIS — N13.8 BENIGN PROSTATIC HYPERPLASIA WITH LOWER URINARY TRACT SYMPMS: ICD-10-CM

## 2022-05-17 DIAGNOSIS — N40.1 BENIGN PROSTATIC HYPERPLASIA WITH LOWER URINARY TRACT SYMPMS: ICD-10-CM

## 2022-05-17 DIAGNOSIS — Z87.438 PERSONAL HISTORY OF OTHER DISEASES OF MALE GENITAL ORGANS: ICD-10-CM

## 2022-05-17 DIAGNOSIS — R39.15 URGENCY OF URINATION: ICD-10-CM

## 2022-05-17 PROCEDURE — 99204 OFFICE O/P NEW MOD 45 MIN: CPT

## 2022-05-17 RX ORDER — TROSPIUM CHLORIDE 20 MG/1
20 TABLET, FILM COATED ORAL TWICE DAILY
Qty: 180 | Refills: 3 | Status: ACTIVE | COMMUNITY
Start: 2022-05-17 | End: 1900-01-01

## 2022-05-17 RX ORDER — CARBIDOPA AND LEVODOPA 25; 100 MG/1; MG/1
25-100 TABLET ORAL 3 TIMES DAILY
Qty: 270 | Refills: 3 | Status: COMPLETED | COMMUNITY
Start: 2019-11-18 | End: 2022-05-17

## 2022-05-20 LAB — BACTERIA UR CULT: NORMAL

## 2022-05-21 ENCOUNTER — APPOINTMENT (OUTPATIENT)
Dept: CT IMAGING | Facility: IMAGING CENTER | Age: 72
End: 2022-05-21
Payer: MEDICARE

## 2022-05-21 ENCOUNTER — OUTPATIENT (OUTPATIENT)
Dept: OUTPATIENT SERVICES | Facility: HOSPITAL | Age: 72
LOS: 1 days | End: 2022-05-21
Payer: MEDICARE

## 2022-05-21 DIAGNOSIS — G91.2 (IDIOPATHIC) NORMAL PRESSURE HYDROCEPHALUS: ICD-10-CM

## 2022-05-21 DIAGNOSIS — Z98.890 OTHER SPECIFIED POSTPROCEDURAL STATES: Chronic | ICD-10-CM

## 2022-05-21 DIAGNOSIS — I83.90 ASYMPTOMATIC VARICOSE VEINS OF UNSPECIFIED LOWER EXTREMITY: Chronic | ICD-10-CM

## 2022-05-21 DIAGNOSIS — Z00.8 ENCOUNTER FOR OTHER GENERAL EXAMINATION: ICD-10-CM

## 2022-05-21 LAB
APPEARANCE: CLEAR
BACTERIA: NEGATIVE
BILIRUBIN URINE: NEGATIVE
BLOOD URINE: NEGATIVE
COLOR: YELLOW
GLUCOSE QUALITATIVE U: ABNORMAL
HYALINE CASTS: 1 /LPF
KETONES URINE: NORMAL
LEUKOCYTE ESTERASE URINE: NEGATIVE
MICROSCOPIC-UA: NORMAL
NITRITE URINE: NEGATIVE
PH URINE: 5.5
PROTEIN URINE: NORMAL
RED BLOOD CELLS URINE: 1 /HPF
SPECIFIC GRAVITY URINE: 1.03
SQUAMOUS EPITHELIAL CELLS: 0 /HPF
UROBILINOGEN URINE: NORMAL
WHITE BLOOD CELLS URINE: 1 /HPF

## 2022-05-21 PROCEDURE — 70450 CT HEAD/BRAIN W/O DYE: CPT

## 2022-05-21 PROCEDURE — 70450 CT HEAD/BRAIN W/O DYE: CPT | Mod: 26

## 2022-06-02 ENCOUNTER — APPOINTMENT (OUTPATIENT)
Dept: NEUROSURGERY | Facility: CLINIC | Age: 72
End: 2022-06-02
Payer: MEDICARE

## 2022-06-02 VITALS
WEIGHT: 151 LBS | RESPIRATION RATE: 16 BRPM | BODY MASS INDEX: 25.16 KG/M2 | DIASTOLIC BLOOD PRESSURE: 82 MMHG | HEIGHT: 65 IN | TEMPERATURE: 98.6 F | OXYGEN SATURATION: 99 % | HEART RATE: 68 BPM | SYSTOLIC BLOOD PRESSURE: 123 MMHG

## 2022-06-02 PROCEDURE — 99215 OFFICE O/P EST HI 40 MIN: CPT

## 2022-06-02 PROCEDURE — 62252 CSF SHUNT REPROGRAM: CPT

## 2022-06-02 NOTE — ASSESSMENT
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, 2018\par Codman Certas valve now at 4\par Not feeling as energetic and active as before\par Orthostatic hypotension - normal BP today\par \par \par PLAN:\par \par 1: Advised to continue to follow with his PCP for managing hypotension\par 2: Watch symptoms for improvement for another a week\par 3: F/U on 6/9/2022 for an other evaluation\par 4: Will advise on travel to [place] at the follow up\par 5. Will consider resetting the valve to 5 if he continues to not feel as active\par \par  \par Peter Weldon MD, FACS, FAANS\par Professor and \par Department of Neurosurgery\par [facility]\par \par Clinical Offices:\par [address] [place] [address]\[place] [address]\par \par [address]\[place] [address]\par \par Phone [phone]\par Email: [email or web address]\par \par \par

## 2022-06-02 NOTE — ASSESSMENT
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, 2018\par Codman Certas valve now at 4\par Not feeling as energetic and active as before\par Orthostatic hypotension - normal BP today\par \par \par PLAN:\par \par 1: Advised to continue to follow with his PCP for managing hypotension\par 2: Watch symptoms for improvement for another a week\par 3: F/U on 6/9/2022 for an other evaluation\par 4: Will advise on travel to [place] at the follow up\par 5. Will consider resetting the valve to 5 if he continues to not feel as active\par \par  \par Peter Weldon MD, FACS, FAANS\par Professor and \par Department of Neurosurgery\par [facility]\par \par Clinical Offices:\par [address] [place] [address]\[place] [address]\par \par [address]\[place] [address]\par \par Phone [phone]\par Email: [email or web address]\par \par \par

## 2022-06-02 NOTE — REASON FOR VISIT
[Follow-Up: _____] : a [unfilled] follow-up visit [Spouse] : spouse [Other: _____] : [unfilled] [FreeTextEntry1] : Hydrocephalus

## 2022-06-02 NOTE — CONSULT LETTER
[Dear  ___] : Dear ~SHAWNA, [Please see my note below.] : Please see my note below. [Consult Closing:] : Thank you very much for allowing me to participate in the care of this patient.  If you have any questions, please do not hesitate to contact me. [FreeTextEntry2] : Mary Lou Sales MD\par [address] [facility]\[place] [address] [___] : [unfilled]

## 2022-06-02 NOTE — HISTORY OF PRESENT ILLNESS
[FreeTextEntry1] : JOSELO MADDOX is a 71-year-old gentleman of  origin with PMH of DM2, essential tremors, BPH, sleep apnea, and NPH who underwent a right ventriculoperitoneal shunt with a Codman Certas valve set at 6 and a laparoscopic insertion of the abdominal end on 7/20/2018. He has been following up with Dr. Watlon of Neurology for essential tremors and has been on Propranolol for essential tremors for many years (80 mg /day). He has done well, memory has been stable, walking was the same without using any assistive device. Per wife patient has problem recalling objects otherwise everything has been stable. \par \par Shunt was re programmed from 6 to 5 on 7/15/21 and from 5 to 4 on 4/21/22 due to slight increase in ventricular size. \par \par He is back today with his wife and son. Patient and family report that he has been tired and generally weaker  since the last visit. His memory and gait have been stable to slightly improved than last visit. However he had orthostatic hypotension (BP around 80 systolic) and his PCP stopped Flomax 3 days ago and advised to start on Midodrine. Family thinks that he need more time to improve. New CT head done recently -  here for follow up.\par \par \par TUG test 9.5 seconds (last visit)\par TUG test today 10 seconds \par \par

## 2022-06-02 NOTE — RESULTS/DATA
[FreeTextEntry1] : EXAM: [id number] - CT BRAIN \par \par \par PROCEDURE DATE: 05/21/2022\par \par \par \par INTERPRETATION: Clinical indication: NPH\par \par Multiple axial sections were performed from base skull to vertex without contrast enhancement. 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

## 2022-06-02 NOTE — CONSULT LETTER
[Dear  ___] : Dear ~SHAWNA, [Please see my note below.] : Please see my note below. [Consult Closing:] : Thank you very much for allowing me to participate in the care of this patient.  If you have any questions, please do not hesitate to contact me. [FreeTextEntry2] : Mary Lou Sales MD\par [address] [facility]\[place] [address] [___] : [unfilled]

## 2022-06-02 NOTE — PHYSICAL EXAM
[General Appearance - Alert] : alert [General Appearance - In No Acute Distress] : in no acute distress [General Appearance - Well Nourished] : well nourished [General Appearance - Well-Appearing] : healthy appearing [Oriented To Time, Place, And Person] : oriented to person, place, and time [Impaired Insight] : insight and judgment were intact [Affect] : the affect was normal [Memory Recent] : recent memory was not impaired [Person] : oriented to person [Place] : oriented to place [Time] : oriented to time [Short Term Intact] : short term memory intact [Cranial Nerves Optic (II)] : visual acuity intact bilaterally,  pupils equal round and reactive to light [Cranial Nerves Oculomotor (III)] : extraocular motion intact [Cranial Nerves Trigeminal (V)] : facial sensation intact symmetrically [Cranial Nerves Facial (VII)] : face symmetrical [Cranial Nerves Vestibulocochlear (VIII)] : hearing was intact bilaterally [Cranial Nerves Accessory (XI - Cranial And Spinal)] : head turning and shoulder shrug symmetric [Cranial Nerves Hypoglossal (XII)] : there was no tongue deviation with protrusion [Motor Tone] : muscle tone was normal in all four extremities [Motor Strength] : muscle strength was normal in all four extremities [Sensation Tactile Decrease] : light touch was intact [Sensation Pain / Temperature Decrease] : pain and temperature was intact [Sclera] : the sclera and conjunctiva were normal [PERRL With Normal Accommodation] : pupils were equal in size, round, reactive to light, with normal accommodation [Outer Ear] : the ears and nose were normal in appearance [Both Tympanic Membranes Were Examined] : both tympanic membranes were normal [Neck Appearance] : the appearance of the neck was normal [] : no respiratory distress [Respiration, Rhythm And Depth] : normal respiratory rhythm and effort [Apical Impulse] : the apical impulse was normal [Heart Rate And Rhythm] : heart rate was normal and rhythm regular [Arterial Pulses Carotid] : carotid pulses were normal with no bruits [Abdominal Aorta] : the abdominal aorta was normal [No CVA Tenderness] : no ~M costovertebral angle tenderness [Abnormal Walk] : normal gait [Involuntary Movements] : no involuntary movements were seen [Skin Color & Pigmentation] : normal skin color and pigmentation [FreeTextEntry1] : Not as bright as usual. [Cranial Nerves Glossopharyngeal (IX)] : tongue and palate midline [FreeTextEntry8] : Able to walk slowly, TUG

## 2022-06-02 NOTE — HISTORY OF PRESENT ILLNESS
[FreeTextEntry1] : JOSELO MADDOX is a 71-year-old gentleman of  origin with PMH of DM2, essential tremors, BPH, sleep apnea, and NPH who underwent a right ventriculoperitoneal shunt with a Codman Certas valve set at 6 and a laparoscopic insertion of the abdominal end on 7/20/2018. He has been following up with Dr. Walton of Neurology for essential tremors and has been on Propranolol for essential tremors for many years (80 mg /day). He has done well, memory has been stable, walking was the same without using any assistive device. Per wife patient has problem recalling objects otherwise everything has been stable. \par \par Shunt was re programmed from 6 to 5 on 7/15/21 and from 5 to 4 on 4/21/22 due to slight increase in ventricular size. \par \par He is back today with his wife and son. Patient and family report that he has been tired and generally weaker  since the last visit. His memory and gait have been stable to slightly improved than last visit. However he had orthostatic hypotension (BP around 80 systolic) and his PCP stopped Flomax 3 days ago and advised to start on Midodrine. Family thinks that he need more time to improve. New CT head done recently -  here for follow up.\par \par \par TUG test 9.5 seconds (last visit)\par TUG test today 10 seconds \par \par

## 2022-06-08 ENCOUNTER — APPOINTMENT (OUTPATIENT)
Dept: NEUROLOGY | Facility: CLINIC | Age: 72
End: 2022-06-08
Payer: MEDICARE

## 2022-06-08 DIAGNOSIS — R20.0 ANESTHESIA OF SKIN: ICD-10-CM

## 2022-06-08 PROCEDURE — 95886 MUSC TEST DONE W/N TEST COMP: CPT

## 2022-06-08 PROCEDURE — 95910 NRV CNDJ TEST 7-8 STUDIES: CPT

## 2022-06-08 NOTE — PROCEDURE
[FreeTextEntry1] :   \par [facility]\par [facility]\par Neurology and Electrophysiology\par \par Nerve Conduction & EMG Report\par \par \par   \par Full Name:	Gary Rangel	Gender:	Male\par MRN:	[id number]	YOB: 1950\par   \par Visit Date:	6/8/2022 11:44\par Age:	72 Years\par Examining Physician:	Mary Lou Sales MD\par Height:	5 feet 5 inch\par Weight:	151 lbs\par   \par ________________________________________\par \par Conclusion: \par \par Sensory nerve conductions\par Right median sensory nerve action potential had normal latency, normal amplitude and decreased conduction velocity.\par \par Right ulnar sensory nerve action potential had normal latency, normal amplitude and decreased conduction velocity.\par \par Right dorsal ulnar cutaneous sensory nerve action potential had prolonged latency, normal amplitude and decreased conduction velocity.\par \par Motor nerve conductions\par Right median nerve compound motor action potential had normal latency, normal amplitude and normal conduction velocity.\par \par Right ulnar nerve compound motor action potential had normal latency, normal amplitude and decreased conduction velocity.\par \par Left ulnar nerve compound motor action potential had normal latency, normal amplitude and decreased conduction velocity with slowing across the elbow.\par \par Right ulnar nerve compound motor action potential recording at the first dorsal interosseous muscle had normal latency, normal amplitude and decreased conduction velocity.\par \par \par F-wave latency of the right median nerve was within normal limits.  F-wave latency of the right ulnar nerve was prolonged and the left ulnar nerve was within normal limits.\par \par Needle EMG\par Needle EMG was performed using a disposable concentric needle in the following muscles: \par The right first dorsal interosseous muscle had no spontaneous activity and normal motor units.\par \par Abductor digiti minimi had no spontaneous activity but increased duration and polyphasia.\par \par Flexor digitorum profundus 4 and 5 muscle had no spontaneous activity and increase in duration and polyphasia.\par \par Flexor carpi ulnaris had increase in duration and polyphasia.\par \par Abductor pollicis brevis had no spontaneous activity and increased duration and polyphasia.\par \par Right extensor indices proprius had increased polyphasia.\par \par Right cervical paraspinal muscle had increased fibrillation and positive sharp waves.\par \par Summary: Abnormal study.  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  On the needle EMG study there seems to be evidence of ulnar that is localized to the elbow as well as possibly superimposed cervical radiculopathy. \par \par The study was compared to that in 2019 which also documented median neuropathy by sensory criteria and ulnar neuropathy at the elbow bilaterally.  At that time needle EMG showed no abnormal findings.\par \par \par Clinical and radiologic correlation is advised. \par Thank you for the consult,\par Mary Lou Sales MD\par Diplomat, American Board of Neurology and Psychiatry\par \par ________________________________________\par  \par    \par \par   \par Sensory NCS\par   \par Nerve / Sites	Rec. Site	Onset Lat	Peak Lat	NP Amp	PP Amp	Segments	Distance	Velocity\par 		ms	ms	µV	µV		cm	m/s\par R Median - Dig II (Antidromic)\par    Wrist	Index	2.69	3.50	43.1	78.1	Wrist - Index	13	48\par R Ulnar - Dig V (Antidromic)\par    Wrist	Dig V	2.33	3.17	15.8	42.0	Wrist - Dig V	11	47\par R Dorsal ulnar cutaneous - Hand dorsum (Forearm)\par    Forearm	Hand dorsum	2.25	2.90	19.6		Forearm - Hand dorsum	8	36\par   \par Motor NCS\par   \par Nerve / Sites	Muscle	Latency	Amplitude	Rel Amp	Segments	Distance	Lat Diff	Velocity	Durat\par 		ms	mV	%		cm	ms	m/s	ms\par R Median - APB\par    Wrist	APB	2.83	11.4		Wrist - APB	8			5.77\par    Elbow	APB	7.13	10.7	93.8	Elbow - Wrist	21	4.29	49	6.23\par R Ulnar - ADM\par    Wrist	ADM	2.65	13.3	100	Wrist - ADM	5			5.67\par    B.Elbow	ADM	7.29	13.0	97.9	B.Elbow - Wrist	22	4.65	47	6.65\par    A.Elbow	ADM	9.35	12.3	94.2	A.Elbow - B.Elbow	10	2.06	48	6.88\par L Ulnar - ADM\par    Wrist	ADM	2.60	13.0	100	Wrist - ADM	5			5.63\par    B.Elbow	ADM	7.10	12.2	93.5	B.Elbow - Wrist	21	4.50	47	6.13\par    A.Elbow	ADM	9.42	11.4	93.7	A.Elbow - B.Elbow	9	2.31	39	6.21\par R Ulnar - FDI\par    Wrist	FDI	3.38	11.3	100	Wrist - FDI	14			4.85\par    B.Elbow	FDI	8.60	10.5	92.7	B.Elbow - Wrist	21	5.23	40	4.73\par    A.Elbow	FDI	10.75	10.2	97.1	A.Elbow - B.Elbow	9	2.15	42	5.12\par   \par F  Wave\par   \par Nerve	F min\par 	ms\par R Median - APB	28.3\par R Ulnar - ADM	31.7\par L Ulnar - ADM	29.7\par   \par EMG Summary Table	\par 	Spontaneous	MUAP	Recruitment\par Muscle	Nerve	Roots	IA	Fib	PSW	Fasc	Comments	Amp	Dur.	PPP	Pattern\par R. First dorsal interosseous	Ulnar	C8-T1	N	None	None	None	None	N	N	N	N\par R. Abductor digiti minimi (manus)	Ulnar	C8-T1	N	None	None	None	None	N	1+	2+	N\par R. Flexor digitorum profundus, dig 4 & 5	Ulnar	C8-T1	N	None	None	None	None	N	2+	1+	N\par R. Flexor carpi ulnaris	Ulnar	C7-T1	N	None	None	None	None	N	1+	1+	N\par R. Abductor pollicis brevis	Median	C8-T1	N	None	None	None	None	N	1+	1+	N\par R. Extensor indicis proprius	Radial	C7-C8	N	None	None	None	None	N	N	1+	N\par R. Cervical paraspinals	Spinal	C4-C8	N	1+	1+	None					\par                               \par  \par  Fluconazole Pregnancy And Lactation Text: This medication is Pregnancy Category C and it isn't know if it is safe during pregnancy. It is also excreted in breast milk.

## 2022-06-09 ENCOUNTER — APPOINTMENT (OUTPATIENT)
Dept: NEUROSURGERY | Facility: CLINIC | Age: 72
End: 2022-06-09
Payer: MEDICARE

## 2022-06-09 VITALS
DIASTOLIC BLOOD PRESSURE: 74 MMHG | SYSTOLIC BLOOD PRESSURE: 105 MMHG | HEART RATE: 73 BPM | OXYGEN SATURATION: 97 % | TEMPERATURE: 97.8 F

## 2022-06-09 PROCEDURE — 62252 CSF SHUNT REPROGRAM: CPT

## 2022-06-09 NOTE — HISTORY OF PRESENT ILLNESS
[FreeTextEntry1] : JOSELO MADDOX is a 71-year-old gentleman of  origin with PMH of DM2, essential tremors, BPH, sleep apnea, and NPH who underwent a right ventriculoperitoneal shunt with a Codman Certas valve set at 6 and a laparoscopic insertion of the abdominal end on 7/20/2018. He has been following up with Dr. Walton of Neurology for essential tremors and has been on Propranolol for essential tremors for many years (80 mg /day). He has done well, memory has been stable, walking was the same without using any assistive device. Per wife patient has problem recalling objects otherwise everything has been stable. \par \par Shunt was re programmed from 6 to 5 on 7/15/21 and from 5 to 4 on 4/21/22 due to slight increase in ventricular size. \par \par Last office visit 6/2/22 with his wife and son. Patient and family reported that he has been tired and generally weaker. His memory and gait have been stable to slightly improved than last visit. However he had orthostatic hypotension (BP around 80 systolic) and his PCP stopped  and advised to start on Midodrine. Family thinks that he need more time to improve. New CT head done recently.  I advised him to continue to follow with his PCP for managing hypotension, to watch symptoms for improvement for another a week and to follow up on  6/9/2022 for another evaluation. We also discussed possibility of resetting the valve to 5 if he continues to not feel as active.\par \par Patient here today with spouse, reports that he continues to feel generally weaker.\par \par TUG test 10 seconds last visit\par TUG test today: 11 secs\par \par

## 2022-06-09 NOTE — CONSULT LETTER
[Dear  ___] : Dear ~SHAWNA, [Courtesy Letter:] : I had the pleasure of seeing your patient, [unfilled], in my office today. [Please see my note below.] : Please see my note below. [Consult Closing:] : Thank you very much for allowing me to participate in the care of this patient.  If you have any questions, please do not hesitate to contact me. [Sincerely,] : Sincerely, [FreeTextEntry2] : Mary Lou Sales MD\par [address]\[place] [address] [FreeTextEntry3] : Peter Weldon MD, FACS, FAANS\par Professor and \par Department of Neurosurgery\par [facility] [employer] [facility]\par \par Clinical Offices:\par [address]\[place] [address]\par \par [address]\[place] [address]\par \par Phone [phone]\par Email: [email or web address]\par \par

## 2022-06-09 NOTE — RESULTS/DATA
[FreeTextEntry1] : EXAM: [id number] - CT BRAIN \par \par \par PROCEDURE DATE: 05/21/2022\par \par \par \par INTERPRETATION: Clinical indication: NPH\par \par Multiple axial sections were performed from base skull to vertex without contrast enhancement. 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

## 2022-06-09 NOTE — ASSESSMENT
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, 2018\par Codregan Certas valve now at 4\par Not feeling as energetic and active as before\par Orthostatic hypotension - on Midodrine per his PCP, BP today 105/74\par \par PLAN:\par Valve reset from 4 to 5\par Will call me in a week to report progress\par Plans to go to [place] at the end of the month.\par \par \par Peter Weldon MD, FACS, FAANS\par Professor and \par Department of Neurosurgery\par [facility] [employer] [facility] at [facility]\par \par Clinical Offices:\par [address] [place] [address]\[place] [address]\par \par [address]\[place] [address]\par \par Phone [phone]\par Email: [email or web address] \par \par \par \par  \par

## 2022-06-09 NOTE — PHYSICAL EXAM
[General Appearance - Alert] : alert [General Appearance - In No Acute Distress] : in no acute distress [General Appearance - Well Nourished] : well nourished [General Appearance - Well-Appearing] : healthy appearing [Oriented To Time, Place, And Person] : oriented to person, place, and time [Impaired Insight] : insight and judgment were intact [Affect] : the affect was normal [Memory Recent] : recent memory was not impaired [Person] : oriented to person [Place] : oriented to place [Time] : oriented to time [Short Term Intact] : short term memory intact [Cranial Nerves Optic (II)] : visual acuity intact bilaterally,  pupils equal round and reactive to light [Cranial Nerves Oculomotor (III)] : extraocular motion intact [Cranial Nerves Trigeminal (V)] : facial sensation intact symmetrically [Cranial Nerves Facial (VII)] : face symmetrical [Cranial Nerves Vestibulocochlear (VIII)] : hearing was intact bilaterally [Cranial Nerves Glossopharyngeal (IX)] : tongue and palate midline [Cranial Nerves Accessory (XI - Cranial And Spinal)] : head turning and shoulder shrug symmetric [Cranial Nerves Hypoglossal (XII)] : there was no tongue deviation with protrusion [Motor Tone] : muscle tone was normal in all four extremities [Motor Strength] : muscle strength was normal in all four extremities [Sensation Tactile Decrease] : light touch was intact [Sensation Pain / Temperature Decrease] : pain and temperature was intact [Sclera] : the sclera and conjunctiva were normal [PERRL With Normal Accommodation] : pupils were equal in size, round, reactive to light, with normal accommodation [Outer Ear] : the ears and nose were normal in appearance [Both Tympanic Membranes Were Examined] : both tympanic membranes were normal [Neck Appearance] : the appearance of the neck was normal [] : no respiratory distress [Respiration, Rhythm And Depth] : normal respiratory rhythm and effort [Apical Impulse] : the apical impulse was normal [Heart Rate And Rhythm] : heart rate was normal and rhythm regular [Arterial Pulses Carotid] : carotid pulses were normal with no bruits [Abdominal Aorta] : the abdominal aorta was normal [No CVA Tenderness] : no ~M costovertebral angle tenderness [Abnormal Walk] : normal gait [Involuntary Movements] : no involuntary movements were seen [Skin Color & Pigmentation] : normal skin color and pigmentation [FreeTextEntry1] : Not as bright as usual. [FreeTextEntry8] : Able to walk slowly, TUG

## 2022-06-15 ENCOUNTER — APPOINTMENT (OUTPATIENT)
Dept: PHARMACY | Facility: CLINIC | Age: 72
End: 2022-06-15
Payer: SELF-PAY

## 2022-06-15 PROCEDURE — V5299A: CUSTOM | Mod: NC

## 2022-06-22 ENCOUNTER — APPOINTMENT (OUTPATIENT)
Dept: PHARMACY | Facility: CLINIC | Age: 72
End: 2022-06-22
Payer: SELF-PAY

## 2022-06-22 PROCEDURE — V5299A: CUSTOM | Mod: NC

## 2022-06-24 ENCOUNTER — APPOINTMENT (OUTPATIENT)
Dept: PHARMACY | Facility: CLINIC | Age: 72
End: 2022-06-24

## 2022-07-27 ENCOUNTER — APPOINTMENT (OUTPATIENT)
Dept: PHARMACY | Facility: CLINIC | Age: 72
End: 2022-07-27

## 2022-07-27 PROCEDURE — V5299A: CUSTOM | Mod: NC

## 2022-07-28 ENCOUNTER — APPOINTMENT (OUTPATIENT)
Dept: PLASTIC SURGERY | Facility: CLINIC | Age: 72
End: 2022-07-28

## 2022-07-28 VITALS
HEART RATE: 83 BPM | BODY MASS INDEX: 24.99 KG/M2 | OXYGEN SATURATION: 97 % | HEIGHT: 65 IN | DIASTOLIC BLOOD PRESSURE: 82 MMHG | SYSTOLIC BLOOD PRESSURE: 130 MMHG | TEMPERATURE: 97 F | WEIGHT: 150 LBS

## 2022-07-28 PROCEDURE — XXXXX: CPT | Mod: 1L

## 2022-08-19 ENCOUNTER — APPOINTMENT (OUTPATIENT)
Dept: PHARMACY | Facility: CLINIC | Age: 72
End: 2022-08-19

## 2022-08-19 PROCEDURE — V5299A: CUSTOM | Mod: NC

## 2022-08-26 NOTE — H&P PST ADULT - ENMT
Reviewed current and expected anthropometric data for the child. Diet and exercise recommendations provided.    No oral lesions; no gross abnormalities details…

## 2022-09-07 ENCOUNTER — APPOINTMENT (OUTPATIENT)
Dept: CT IMAGING | Facility: IMAGING CENTER | Age: 72
End: 2022-09-07

## 2022-09-08 ENCOUNTER — OFFICE (OUTPATIENT)
Dept: URBAN - METROPOLITAN AREA CLINIC 86 | Facility: CLINIC | Age: 72
Setting detail: OPHTHALMOLOGY
End: 2022-09-08
Payer: COMMERCIAL

## 2022-09-08 DIAGNOSIS — H11.041: ICD-10-CM

## 2022-09-08 DIAGNOSIS — E11.9: ICD-10-CM

## 2022-09-08 DIAGNOSIS — H25.13: ICD-10-CM

## 2022-09-08 DIAGNOSIS — H40.033: ICD-10-CM

## 2022-09-08 PROCEDURE — 92020 GONIOSCOPY: CPT | Performed by: OPHTHALMOLOGY

## 2022-09-08 PROCEDURE — 92014 COMPRE OPH EXAM EST PT 1/>: CPT | Performed by: OPHTHALMOLOGY

## 2022-09-08 ASSESSMENT — TONOMETRY
OD_IOP_MMHG: 12
OS_IOP_MMHG: 11

## 2022-09-08 ASSESSMENT — CORNEAL DYSTROPHY - POSTERIOR: OD_POSTERIORDYSTROPHY: T GUTTATA

## 2022-09-08 ASSESSMENT — CONFRONTATIONAL VISUAL FIELD TEST (CVF)
OS_FINDINGS: FULL
OD_FINDINGS: FULL

## 2022-09-08 ASSESSMENT — CORNEAL PTERYGIUM: OD_PTERYGIUM: 2MM

## 2022-09-09 NOTE — H&P PST ADULT - ENT GEN HX ROS MEA POS PC
Department of Anesthesiology  Preprocedure Note       Name:  Araseli Samuels   Age:  44 y.o.  :  1972                                          MRN:  [id number]         Date:  2022      Surgeon: Dede Farmer):  Jayne Sullivan hearing difficulty

## 2022-09-11 ASSESSMENT — REFRACTION_MANIFEST
OD_AXIS: 20
OS_AXIS: 50
OS_CYLINDER: +0.25
OD_CYLINDER: +0.50
OS_ADD: +2.50
OD_ADD: +2.50
OD_SPHERE: +3.75
OD_VA1: 20/20-
OS_SPHERE: +3.50
OS_VA1: 20/20-

## 2022-09-11 ASSESSMENT — REFRACTION_CURRENTRX
OS_CYLINDER: +0.50
OD_OVR_VA: 20/
OS_SPHERE: +3.00
OD_AXIS: 15
OD_SPHERE: +3.25
OD_ADD: +2.50
OS_ADD: +2.50
OS_AXIS: 50
OD_CYLINDER: +0.50
OS_OVR_VA: 20/

## 2022-09-11 ASSESSMENT — VISUAL ACUITY
OS_BCVA: 20/30
OD_BCVA: 20/25

## 2022-09-11 ASSESSMENT — SPHEQUIV_DERIVED
OD_SPHEQUIV: 4
OS_SPHEQUIV: 3.625

## 2022-09-15 ENCOUNTER — APPOINTMENT (OUTPATIENT)
Dept: CT IMAGING | Facility: IMAGING CENTER | Age: 72
End: 2022-09-15

## 2022-09-15 ENCOUNTER — OUTPATIENT (OUTPATIENT)
Dept: OUTPATIENT SERVICES | Facility: HOSPITAL | Age: 72
LOS: 1 days | End: 2022-09-15
Payer: MEDICARE

## 2022-09-15 DIAGNOSIS — Z98.890 OTHER SPECIFIED POSTPROCEDURAL STATES: Chronic | ICD-10-CM

## 2022-09-15 DIAGNOSIS — Z00.8 ENCOUNTER FOR OTHER GENERAL EXAMINATION: ICD-10-CM

## 2022-09-15 DIAGNOSIS — I83.90 ASYMPTOMATIC VARICOSE VEINS OF UNSPECIFIED LOWER EXTREMITY: Chronic | ICD-10-CM

## 2022-09-15 PROCEDURE — 70450 CT HEAD/BRAIN W/O DYE: CPT | Mod: 26

## 2022-09-15 PROCEDURE — 70450 CT HEAD/BRAIN W/O DYE: CPT

## 2022-10-18 ENCOUNTER — APPOINTMENT (OUTPATIENT)
Dept: NEUROLOGY | Facility: CLINIC | Age: 72
End: 2022-10-18

## 2022-10-18 VITALS — HEART RATE: 65 BPM | DIASTOLIC BLOOD PRESSURE: 77 MMHG | SYSTOLIC BLOOD PRESSURE: 115 MMHG

## 2022-10-18 VITALS
HEIGHT: 65 IN | SYSTOLIC BLOOD PRESSURE: 116 MMHG | DIASTOLIC BLOOD PRESSURE: 74 MMHG | WEIGHT: 140 LBS | BODY MASS INDEX: 23.32 KG/M2 | HEART RATE: 63 BPM

## 2022-10-18 VITALS — SYSTOLIC BLOOD PRESSURE: 106 MMHG | HEART RATE: 69 BPM | DIASTOLIC BLOOD PRESSURE: 72 MMHG

## 2022-10-18 PROBLEM — F41.9 ANXIETY AND DEPRESSION: Status: ACTIVE | Noted: 2022-10-18

## 2022-10-18 PROCEDURE — 99214 OFFICE O/P EST MOD 30 MIN: CPT

## 2022-10-18 RX ORDER — MIRTAZAPINE 7.5 MG/1
7.5 TABLET, FILM COATED ORAL
Qty: 90 | Refills: 3 | Status: ACTIVE | COMMUNITY
Start: 2022-10-18 | End: 1900-01-01

## 2022-10-18 NOTE — HISTORY OF PRESENT ILLNESS
[FreeTextEntry1] : This is a 69-year-old ambidextrous male who presents with his wife Lelia. Patient is here to rule out Parkinson's disease. He has been noticing facial twitching.\par \par Patient states that he has 35+ years history of essential tremor which is pretty well controlled with propanolol. He has recently increased the dose to 40 mg in the morning and 20 mg in the evening. About 2 years ago in 2017 he was noted to have difficulty with walking increased urinary frequency which did get better with BPH medications. He underwent  shunting for normal pressure hydrocephalus. He states initially he had very good response however for about a month now he is noticing that his walking has become slower and. He may shuffle. He has not had any falls. He is also noticing some twitching around his face. And memory is not as good-  he is having difficulty keeping track of dates and times. No hallucinations. has REM behavior disorder. He has sleep apnea for which he uses a dental guard and follows with a pulmonologist.\par \par Mild memory changes as above. REM behavior disorder. Anosmia for about a year. No headaches no change in vision no difficulty swallowing some drooling is present especially at nighttime he has chronic constipation. No difficulty with control of urine. No difficulty controlling blood pressure.\par \par Review of systems a complete review of systems is performed and is negative except as listed in history of present illness\par \par Family history: brother had subarachnoid hemorrhage secondary to aneurysm \par father passed away at the age of 66. \par Mother lived to be in her late 90s. \par No family history of tremors or Parkinson's disease.\par \par Past medical history\par NPH status post  shunt \par diabetes mellitus type 2 \par obstructive sleep apnea \par essential tremor\par High cholesterol\par Gingivitis\par Cardiac catheterization\par \par Current medications\par Propranolol\par Lipitor\par Metformin \par Flomax\par \par interval history: \par Since the last visit,unchanged. off sinemet no difference in tremor/ movement, no falls. ET is well controlled. reduced propranolol to 40bid. reports some light headed ness on standing up, depression. lack of taste and appetite. PCP suggested mirtazapine, not taken yet\par \par Interval history October 18, 2022.  Patient was seen after more than 2 years.  Patient presents to follow-up on tremor.  At this visit he is accompanied by his wife and his son who is a physician.  Patient states that his essential tremor symptoms are quite well controlled he is on propanolol 40 mg twice a day.  He also had a repeat CAT scan of his head and some shunt adjustment which helped his balance also he is getting physical therapy that has improved her walking and shuffling.  His family finds that his speech is getting softer, hoarser and sometimes he does not respond well memory is also a affected.  There are no hallucinations.  He is excessively sleepy during daytime.  He uses CPAP machine.  Follows up with sleep medicine.  He has stopped using melatonin.  In the past he had RBD leg symptoms which have resolved completely.  His son notices that when he walks he gets tired easily and diaphoretic.  He was started on midodrine.  Flomax was stopped and now he is on finasteride.  They have been noticing an increase in resting tremor mostly in the left upper extremity.  He also has chin tremor.  His appetite has reduced and his weight has reduced he has not started mirtazapine yet\par \par

## 2022-10-18 NOTE — PHYSICAL EXAM
[FreeTextEntry1] : Patient is awake and alert. He is pleasant cooperative with the exam.  Extraocular movements are intact. Face is symmetric. \par Facial expression is slightly reduced especially  blink rate. Tilting of the head to the left shoulder is seen, No pain or limitation of neck movement. jaw tremor is present. No head tremor.  No tongue tremor is present.\par Intermittent grade 2 resting tremor is seen in the left upper extremity.  No postural tremors are seen today\par No bradykinesia no rigidity\par No difficulty getting up from the chair\par Gait is somewhat slow and wide-based.  No shuffling\par Good bilateral arm swing

## 2022-10-18 NOTE — DISCUSSION/SUMMARY
[FreeTextEntry1] : This is a 72-year-old male who presents for follow up of jaw movement and slowed walking-had history of REM behavior disorder that has improved with melatonin in the past now he has stopped melatonin and is on CPAP without return of REM behavior disorder.. He endorses symptoms of depression. Cognitive changes, night time drooling, poor appetite with 3-4 lbs wt loss, urinary frequency, orthostatic hypotension \par \par He is noted to have tilting of his head to the left shoulder he states this is present since grade 12 and has not changed. He has history of NPH status post  shunt. SUNDEEP scan neg for PD, no response to sinemet. ET better with propranolol, reports some lightheadedness, low BP and depression.\par \par Impression: NPH.ET, unclear etiology of jaw tremor/left upper extremity tremor\par Overall ET symptoms are quite well controlled on exam today\par Some drop in blood pressure, orthostatic\par Possibility of depression was discussed\par \par Plan\par given low BP and good tremor control can consider reducing propranolol from 40 twice daily to 20-40 per day.  On it her blood pressure and tremors\par Description for mirtazapine 7.5 mg at bedtime was sent\par Consider repeat neuropsych evaluation\par Suggested psychiatry follow-up for depression\par May consider a retrial of Sinemet once blood pressure has improved\par all questions addressed and answered

## 2022-11-03 ENCOUNTER — APPOINTMENT (OUTPATIENT)
Dept: PULMONOLOGY | Facility: CLINIC | Age: 72
End: 2022-11-03
Payer: MEDICARE

## 2022-11-03 VITALS — OXYGEN SATURATION: 96 % | SYSTOLIC BLOOD PRESSURE: 114 MMHG | DIASTOLIC BLOOD PRESSURE: 74 MMHG | HEART RATE: 64 BPM

## 2022-11-03 PROCEDURE — 99213 OFFICE O/P EST LOW 20 MIN: CPT

## 2022-11-03 NOTE — HISTORY OF PRESENT ILLNESS
[TextBox_4] : Here for PARMINDER followup. Reports no current respiratory symptoms or sleep symptoms. Using PAP on a nightly basis without difficulty. Reports no symptoms of daytime sleepiness. Getting supplies regularly. \par \par Device: s10 auto\par \par Vendor: aps\par \par Setting:cPAP 8\par \par Mask:NASAL\par \par Issues: No specific issues tolerating CPAP well needs updated supplies\par

## 2022-11-03 NOTE — ASSESSMENT
[FreeTextEntry1] : Patient is currently on treatment with PAP. Data download confirms excellent compliance. Patient continues to use treatment and is benefiting from it.\par \par Order supplies follow-up 1 year

## 2023-07-18 ENCOUNTER — APPOINTMENT (OUTPATIENT)
Dept: NEUROLOGY | Facility: CLINIC | Age: 73
End: 2023-07-18
Payer: MEDICARE

## 2023-07-18 PROCEDURE — 96139 PSYCL/NRPSYC TST TECH EA: CPT | Mod: NC

## 2023-07-18 PROCEDURE — 96132 NRPSYC TST EVAL PHYS/QHP 1ST: CPT

## 2023-07-18 PROCEDURE — 96116 NUBHVL XM PHYS/QHP 1ST HR: CPT

## 2023-07-18 PROCEDURE — 96133 NRPSYC TST EVAL PHYS/QHP EA: CPT

## 2023-07-18 PROCEDURE — 96138 PSYCL/NRPSYC TECH 1ST: CPT | Mod: NC

## 2023-07-20 ENCOUNTER — APPOINTMENT (OUTPATIENT)
Dept: CT IMAGING | Facility: IMAGING CENTER | Age: 73
End: 2023-07-20
Payer: MEDICARE

## 2023-07-20 ENCOUNTER — OUTPATIENT (OUTPATIENT)
Dept: OUTPATIENT SERVICES | Facility: HOSPITAL | Age: 73
LOS: 1 days | End: 2023-07-20
Payer: MEDICARE

## 2023-07-20 DIAGNOSIS — Z98.890 OTHER SPECIFIED POSTPROCEDURAL STATES: Chronic | ICD-10-CM

## 2023-07-20 DIAGNOSIS — I83.90 ASYMPTOMATIC VARICOSE VEINS OF UNSPECIFIED LOWER EXTREMITY: Chronic | ICD-10-CM

## 2023-07-20 DIAGNOSIS — Q04.3 OTHER REDUCTION DEFORMITIES OF BRAIN: ICD-10-CM

## 2023-07-20 PROCEDURE — 70450 CT HEAD/BRAIN W/O DYE: CPT

## 2023-07-20 PROCEDURE — 70450 CT HEAD/BRAIN W/O DYE: CPT | Mod: 26

## 2023-07-26 ENCOUNTER — APPOINTMENT (OUTPATIENT)
Dept: NEUROLOGY | Facility: CLINIC | Age: 73
End: 2023-07-26

## 2023-08-09 ENCOUNTER — APPOINTMENT (OUTPATIENT)
Dept: PHARMACY | Facility: CLINIC | Age: 73
End: 2023-08-09
Payer: SELF-PAY

## 2023-08-09 PROCEDURE — V5299J: CUSTOM

## 2023-10-31 ENCOUNTER — APPOINTMENT (OUTPATIENT)
Dept: PULMONOLOGY | Facility: CLINIC | Age: 73
End: 2023-10-31
Payer: MEDICARE

## 2023-10-31 VITALS — SYSTOLIC BLOOD PRESSURE: 111 MMHG | DIASTOLIC BLOOD PRESSURE: 72 MMHG | OXYGEN SATURATION: 100 % | HEART RATE: 66 BPM

## 2023-10-31 DIAGNOSIS — G47.33 OBSTRUCTIVE SLEEP APNEA (ADULT) (PEDIATRIC): ICD-10-CM

## 2023-10-31 PROCEDURE — 99213 OFFICE O/P EST LOW 20 MIN: CPT

## 2023-11-01 ENCOUNTER — APPOINTMENT (OUTPATIENT)
Dept: PULMONOLOGY | Facility: CLINIC | Age: 73
End: 2023-11-01

## 2023-12-26 ENCOUNTER — APPOINTMENT (OUTPATIENT)
Dept: NEUROLOGY | Facility: CLINIC | Age: 73
End: 2023-12-26

## 2024-06-10 ENCOUNTER — APPOINTMENT (OUTPATIENT)
Dept: GASTROENTEROLOGY | Facility: CLINIC | Age: 74
End: 2024-06-10
Payer: MEDICARE

## 2024-06-10 VITALS
WEIGHT: 149 LBS | HEART RATE: 84 BPM | HEIGHT: 65 IN | SYSTOLIC BLOOD PRESSURE: 119 MMHG | OXYGEN SATURATION: 98 % | BODY MASS INDEX: 24.83 KG/M2 | DIASTOLIC BLOOD PRESSURE: 75 MMHG

## 2024-06-10 DIAGNOSIS — K59.09 OTHER CONSTIPATION: ICD-10-CM

## 2024-06-10 DIAGNOSIS — R63.0 ANOREXIA: ICD-10-CM

## 2024-06-10 DIAGNOSIS — Z12.11 ENCOUNTER FOR SCREENING FOR MALIGNANT NEOPLASM OF COLON: ICD-10-CM

## 2024-06-10 PROCEDURE — 99204 OFFICE O/P NEW MOD 45 MIN: CPT

## 2024-06-10 RX ORDER — PEG-3350, SODIUM SULFATE, SODIUM CHLORIDE, POTASSIUM CHLORIDE, SODIUM ASCORBATE AND ASCORBIC ACID 7.5-2.691G
100 KIT ORAL
Qty: 1 | Refills: 0 | Status: ACTIVE | COMMUNITY
Start: 2024-06-10 | End: 1900-01-01

## 2024-06-10 NOTE — REVIEW OF SYSTEMS
[Loss Of Hearing] : hearing loss [As Noted in HPI] : as noted in HPI [Urinary Difficulties] : urinary difficulties [Difficulty Walking] : difficulty walking [Negative] : Heme/Lymph [FreeTextEntry8] : Frequency. [de-identified] : Tremor.

## 2024-06-10 NOTE — PHYSICAL EXAM
[Alert] : alert [Normal Voice/Communication] : normal voice/communication [Healthy Appearing] : healthy appearing [No Acute Distress] : no acute distress [Sclera] : the sclera and conjunctiva were normal [Normal Appearance] : the appearance of the neck was normal [No Neck Mass] : no neck mass was observed [No Respiratory Distress] : no respiratory distress [No Acc Muscle Use] : no accessory muscle use [Respiration, Rhythm And Depth] : normal respiratory rhythm and effort [Auscultation Breath Sounds / Voice Sounds] : lungs were clear to auscultation bilaterally [Heart Rate And Rhythm] : heart rate was normal and rhythm regular [Normal S1, S2] : normal S1 and S2 [Murmurs] : no murmurs [None] : no edema [Bowel Sounds] : normal bowel sounds [Abdomen Tenderness] : non-tender [No Masses] : no abdominal mass palpated [Abdomen Soft] : soft [] : no hepatosplenomegaly [Abnormal Walk] : normal gait [No Clubbing, Cyanosis] : no clubbing or cyanosis of the fingernails [Oriented To Time, Place, And Person] : oriented to person, place, and time [Normal Affect] : the affect was normal [Normal Insight/Judgment] : insight and judgment were intact [Normal Mood] : the mood was normal [de-identified] : Small healed scars noted.

## 2024-06-10 NOTE — HISTORY OF PRESENT ILLNESS
[FreeTextEntry1] : Office consultation on 6/10/2024.  The patient is a 74-year-old man evaluated for consultation in preparation for a surveillance colonoscopy.  PMD: Dr. Richard Shabazz.  Previously evaluated for office consultation on 18.  INITIAL CONSULTATION NOTE (2018):  The patient is a 67-year-old man who presents for consultation regarding lower abdominal pain and in preparation for a surveillance colonoscopy. PMD: Dr. Richard Mendoza.  The patient complains of an intermittent lower abdominal pain of 4 months duration. This pain is a lower abdominal nonradiating sharp pain that is exclusively associated with moving his bowels. Patient indicates onset of this pain wall while moving his bowels and that it lingers for 5-10 minutes after finishing the bowel movement. He denies any complaints of abdominal pain during the time interval between bowel movements. This pain is of moderate severity rated at 5-6/10 and described as sharp.  The patient has long-standing history of mild chronic constipation. He typically has a bowel movement every 2-3 days he can have passage of hard stools and screening. However, he denies any change in bowel habit, rectal bleeding or diarrhea.  If the patient initiates a high-fiber diet with itemS such as Jackfruit or tapioca he will have more regular bowel movements every one to 2 days and the severity of the discomfort is significantly decreased although not abolished. Discomfort in association with more regular bowel habits is rated at 1/10.  Patient's appetite and weight are stable. He denies any complaints of dysphagia, heartburn, regurgitation, nausea, vomiting, gaseous discomfort or abdominal distention.  The patient underwent a colonoscopy on 11 at which time a 5 mm proximal ascending colon adenoma, an 8 mm mid ascending colon adenoma and a 3 mm nonneoplastic hepatic flexure polyp were removed.  The patient underwent a surveillance colonoscopy on 14. A total colonoscopy was performed to the cecum. A 6 mm transverse colon hyperplastic polyp was removed and a 4 mm proximal descending colon adenoma was removed. The colon was tortuous and redundant especially in the region of the descending colon and splenic flexure. Hemorrhoids were detected. The colonoscopy examination was otherwise normal  The patient underwent a cardiac catheterization in  which did not reveal any significant pathology. This report is from the patient.  CURRENT HISTORY:  COLONOSCOPY 3/19/18:     -Surveillance colonoscopy was performed on 3/19/18. Patient previously had removal of an adenoma. Total colonoscopy was performed to the cecum. A 3 mm mid ascending colon nonneoplastic polyp was removed. The colon was noted to be markedly redundant and tortuous. The colonoscopy examination was otherwise normal. A followup surveillance colonoscopy is advised in 5 years.   ORV 19:     -Patient presents for followup with complaint of decreased appetite of one year duration. Also reports decreased taste sensation. History per patient and wife. Apparently diagnosed with normal pressure hydrocephalus prompting  shunt insertion 2018.  For the past year patient reports decreased appetite. Describes "no appetite and I don't want to eat". Of note, wife reports over the past few weeks or so he has improved. A 10 pound weight decrease in his reported. However, current weight is only 2 pounds less than when he was assessed on 18 when his weight was 148 pounds.                        -Of note, patient has long-standing issue of decreased smell sensation.                     -Denies any complaints of oral ulcers. No mouth burning symptoms. Denies complaints of dysphagia, heartburn, nausea, vomiting or abdominal pain. He has formed bowel movements daily without any complaint of change in bowel habit, diarrhea or rectal bleeding. Occasional constipation reported. However, this is improved on current regimen of Colace.                     -Patient is followed by neurology for facial tremors. Treated with Sinemet.                      -Patient has a history of depression and chronic anxiety.                      -Patient reports that PMD recently submitted routine labs as well as CT scan examination of the chest, abdomen and pelvis. Patient is awaiting results.  CONSULTATION 6/10/20:     -Evaluated for consultation in preparation for a surveillance colonoscopy.  Wife in attendance throughout interview and examination.  Describes mild but stable chronic constipation.  Has spontaneous bowel movements every 2 to 3 days.  Primarily describes constipation as several days going by without urge to evacuate.  Occasional hard stools but no straining.  Denies rectal bleeding.  Wife indicates patient with inadequate fluid and vegetable intake (despite being a vegetarian).  Has tried Metamucil and MiraLAX but never on a consistent regimen. -Appetite described as chronically decreased for the past approximate 4 years.  He attributes this to metformin.  Indicates approximate 10 pound weight loss 3 years ago but weight stable since that time.  Denies complaints of dysphagia, heartburn, nausea, vomiting or abdominal pain. -PMH: -- Past diseases: Diabetes mellitus, normal pressure hydrocephalus, nonneoplastic colonic polyp, BPH, PARMINDER on CPAP, essential tremor. -- Operations:  shunt, right knee surgery. -- Allergies: None.  Sensitive to latex. -- Family history: Father  age 65; pneumonia.  Mother  age 96.  Patient has 1 brother.  Family history colon cancer not reported.  Maternal uncle had prostate cancer. -- Social history: Tobacco-none.  Alcohol-none.  Caffeine intake reported.  .  Patient has 2 sons. -- Medications: Metformin, glipizide, propranolol, atorvastatin, finasteride, Colace, multivitamin, vitamin C.

## 2024-06-10 NOTE — ADDENDUM
[FreeTextEntry1] : Plan:  #1 current clinical status from GI standpoint discussed with patient as well as wife who was present throughout interview and examination.  Continues to have decreased appetite but stable and nonprogressive.  Weight stable.  #2 history of chronic constipation reviewed.  Importance of adequate fluid intake in conjunction with adequate dietary fiber intake was emphasized.  Advised daily regimen of MiraLAX 17 g.  #3 surveillance colonoscopy will be scheduled.  I had an extensive discussion with the patient including risks, benefits and alternatives possibly including bleeding, perforation, inadvertent injury to contiguous organs including spleen, acute colitis, need for blood transfusion or surgery, infection, and medication and anesthetic risk. The limitations of colonoscopy were discussed including missed polyps and cancer.  Possible medication and anesthesia related adverse cardiovascular and respiratory reactions were reviewed.  The patient wishes to proceed and will be scheduled for a surveillance colonoscopy.  The rationale of colonoscopy was discussed not only in terms of the early detection of colon cancer, but also as a means of prevention in the event of removal of a polyp.  The limitations of colonoscopy were discussed and the patient is aware that not every cancer is prevented.  The patient will utilize the MoviPrep colonoscopy preparation in split fashion and the administration of this product was discussed.   #4 wife indicates the patient had recent labs submitted by PMD which have been requested for review.

## 2024-06-10 NOTE — ASSESSMENT
[FreeTextEntry1] : Impression:  #1 history of colonic polyps-status post removal of a 6 mm transverse colon hyperplastic polyp and 4 mm proximal descending colon adenoma at the 9/22/14 colonoscopy. Colonoscopy on 3/19/18 was noted for removal of a 3 mm mid ascending colon nonneoplastic process and revealed a markedly redundant and tortuous colon. Rule out metachronous lesions.  #2 decreased appetite- chronic symptom of 4 years duration.                 - ASSESSMENT 12/4/2019: Somewhat associated with decreased taste as well as long-standing history of component of anosmia. No associated GI symptoms-essentially asymptomatic from a GI standpoint. No weight change-as noted, only 2 pounds less than at prior assessment of 2/2018. suspect decrease appetite multifactorial and significant issues of NPH status post  shunt 7/2018, neurologic issues of facial tremor, report of depression and anxiety, as well as long history of component of anosmia is likely all contributory regarding decreased appetite. As noted, currently asymptomatic from a GI standpoint and suspect that a primary GI pathologic process is unlikely as cause of patient's decreased appetite.               -CURRENT (6/10/2024): Continues to report decreased appetite but stable and nonprogressive.  No weight loss (3 pounds heavier than when first assessed regarding this symptom 12/2019).  #3 chronic constipation-history consistent with normal transit constipation of functional etiology. Normalizes in conjunction with high-fiber diet but not consistent in that regard (per wife).  #4 diabetes.  #5 hypercholesterolemia.  #6 benign tremor.  #7 obstructive sleep apnea.  #8 normal pressure hydrocephalus-status post  shunt 7/2018.  #9 history of anxiety and depression.  #10 chronic history of component of anosmia.

## 2024-06-10 NOTE — CONSULT LETTER
[Dear  ___] : Dear  [unfilled], [Consult Letter:] : I had the pleasure of evaluating your patient, [unfilled]. [( Thank you for referring [unfilled] for consultation for _____ )] : Thank you for referring [unfilled] for consultation for [unfilled] [Please see my note below.] : Please see my note below. [Consult Closing:] : Thank you very much for allowing me to participate in the care of this patient.  If you have any questions, please do not hesitate to contact me. [Sincerely,] : Sincerely, [FreeTextEntry2] : Dr. Richard Mendoza [FreeTextEntry3] : Roe Ordaz M.D.

## 2024-06-11 ENCOUNTER — APPOINTMENT (OUTPATIENT)
Dept: NEUROLOGY | Facility: CLINIC | Age: 74
End: 2024-06-11
Payer: MEDICARE

## 2024-06-11 VITALS
HEIGHT: 65 IN | HEART RATE: 76 BPM | SYSTOLIC BLOOD PRESSURE: 112 MMHG | WEIGHT: 149 LBS | BODY MASS INDEX: 24.83 KG/M2 | DIASTOLIC BLOOD PRESSURE: 74 MMHG

## 2024-06-11 DIAGNOSIS — G91.2 (IDIOPATHIC) NORMAL PRESSURE HYDROCEPHALUS: ICD-10-CM

## 2024-06-11 DIAGNOSIS — R25.1 TREMOR, UNSPECIFIED: ICD-10-CM

## 2024-06-11 PROCEDURE — 99214 OFFICE O/P EST MOD 30 MIN: CPT

## 2024-06-11 PROCEDURE — G2211 COMPLEX E/M VISIT ADD ON: CPT

## 2024-06-11 NOTE — DISCUSSION/SUMMARY
[FreeTextEntry1] : This is a 72-year-old male who presents for follow up of jaw movement and slowed walking-had history of REM behavior disorder that has improved with melatonin in the past now he has stopped melatonin and is on CPAP without return of REM behavior disorder.. He endorses symptoms of depression. Cognitive changes, night time drooling, poor appetite with 3-4 lbs wt loss, urinary frequency, orthostatic hypotension   He is noted to have tilting of his head to the left shoulder he states this is present since grade 12 and has not changed. He has history of NPH status post  shunt. SUNDEEP scan neg for PD, no response to sinemet. ET better with propranolol, reports some lightheadedness (now improved), low BP and depression.  Impression: NPH.ET, unclear etiology of jaw tremor/left upper extremity tremor, PARMINDER Overall ET symptoms are well controlled on exam today, he is on propranolol and is reporting some depression and in the past has had low blood pressure Possibility of depression was discussed  Plan Referral to psychiatry for mood changes Referral to neurosurgery for consideration of focused ultrasound hopefully with the right VIM we are able to reduce propranolol They follow-up with Dr. Peter Weldon at [facility] for NPH Consider repeat neuropsych evaluation in future Patient continues to have resting tremor and gait slowness.  Consider F dopa PET scan to rule out any dopamine dysfunction PT OT Continue propranolol 40 mg twice a day for now all questions addressed and answered

## 2024-06-11 NOTE — HISTORY OF PRESENT ILLNESS
[FreeTextEntry1] : This is a 69-year-old ambidextrous male who presents with his wife Lelia. Patient is here to rule out Parkinson's disease. He has been noticing facial twitching.  Patient states that he has 35+ years history of essential tremor which is pretty well controlled with propanolol. He has recently increased the dose to 40 mg in the morning and 20 mg in the evening. About 2 years ago in 2017 he was noted to have difficulty with walking increased urinary frequency which did get better with BPH medications. He underwent  shunting for normal pressure hydrocephalus. He states initially he had very good response however for about a month now he is noticing that his walking has become slower and. He may shuffle. He has not had any falls. He is also noticing some twitching around his face. And memory is not as good-  he is having difficulty keeping track of dates and times. No hallucinations. has REM behavior disorder. He has sleep apnea for which he uses a dental guard and follows with a pulmonologist.  Mild memory changes as above. REM behavior disorder. Anosmia for about a year. No headaches no change in vision no difficulty swallowing some drooling is present especially at nighttime he has chronic constipation. No difficulty with control of urine. No difficulty controlling blood pressure.  Review of systems a complete review of systems is performed and is negative except as listed in history of present illness  Family history: brother had subarachnoid hemorrhage secondary to aneurysm  father passed away at the age of 66.  Mother lived to be in her late 90s.  No family history of tremors or Parkinson's disease.  Past medical history NPH status post  shunt  diabetes mellitus type 2  obstructive sleep apnea  essential tremor High cholesterol Gingivitis Cardiac catheterization  Current medications Propranolol Lipitor Metformin  Flomax  interval history:  Since the last visit,unchanged. off sinemet no difference in tremor/ movement, no falls. ET is well controlled. reduced propranolol to 40bid. reports some light headed ness on standing up, depression. lack of taste and appetite. PCP suggested mirtazapine, not taken yet  Interval history October 18, 2022.  Patient was seen after more than 2 years.  Patient presents to follow-up on tremor.  At this visit he is accompanied by his wife and his son who is a physician.  Patient states that his essential tremor symptoms are quite well controlled he is on propanolol 40 mg twice a day.  He also had a repeat CAT scan of his head and some shunt adjustment which helped his balance also he is getting physical therapy that has improved her walking and shuffling.  His family finds that his speech is getting softer, hoarser and sometimes he does not respond well memory is also a affected.  There are no hallucinations.  He is excessively sleepy during daytime.  He uses CPAP machine.  Follows up with sleep medicine.  He has stopped using melatonin.  In the past he had RBD leg symptoms which have resolved completely.  His son notices that when he walks he gets tired easily and diaphoretic.  He was started on midodrine.  Flomax was stopped and now he is on finasteride.  They have been noticing an increase in resting tremor mostly in the left upper extremity.  He also has chin tremor.  His appetite has reduced and his weight has reduced he has not started mirtazapine yet  Interval history June 11, 2024.  Patient presents to follow-up on tremor he is accompanied by his wife and son.  He presents after 2 years.  States that overall tremors are unchanged maybe a little better than before.  Tremors are still present mostly in the left upper extremity and the chin.  He is on propranolol 40 mg twice a day.  Currently he is not having any difficulty with blood pressure control he has also stopped using midodrine.  Appetite has improved since stopping metformin.  He follows with neurosurgery Dr. Weldon for the hydrocephalus and has a follow-up appointment with them.  He also follows with sleep medicine and uses CPAP machine.  They report that he is quite sleepy during daytime he naps frequently.  Walking is getting worse than before.  No falls.  REM behavior symptoms are much better than before now that he is using CPAP and melatonin 10 mg at night he may rarely move in his sleep now.  Sometimes he talks and laughs.  Wife feels memory is getting worse.  Son feels that his mood is quite anxious and depressed.

## 2024-06-11 NOTE — PHYSICAL EXAM
[FreeTextEntry1] : Patient is awake and alert. He is pleasant cooperative with the exam.  Extraocular movements are intact. Face is symmetric.  Jaw tremor is present Facial expression is slightly reduced especially  blink rate. Tilting of the head to the left shoulder is seen, No pain or limitation of neck movement. jaw tremor is present. No head tremor.  No tongue tremor is present. Intermittent grade 2 resting tremor is seen in the left upper extremity.  No postural tremors are seen today, intention tremor present on the left No bradykinesia no rigidity No difficulty getting up from the chair Gait is somewhat slow and wide-based.  No shuffling Good bilateral arm swing

## 2024-07-08 ENCOUNTER — NON-APPOINTMENT (OUTPATIENT)
Age: 74
End: 2024-07-08

## 2024-07-09 ENCOUNTER — APPOINTMENT (OUTPATIENT)
Dept: NEUROSURGERY | Facility: CLINIC | Age: 74
End: 2024-07-09
Payer: MEDICARE

## 2024-07-09 ENCOUNTER — NON-APPOINTMENT (OUTPATIENT)
Age: 74
End: 2024-07-09

## 2024-07-09 VITALS
WEIGHT: 149 LBS | BODY MASS INDEX: 24.83 KG/M2 | SYSTOLIC BLOOD PRESSURE: 124 MMHG | OXYGEN SATURATION: 96 % | DIASTOLIC BLOOD PRESSURE: 80 MMHG | HEART RATE: 66 BPM | HEIGHT: 65 IN

## 2024-07-09 DIAGNOSIS — G25.0 ESSENTIAL TREMOR: ICD-10-CM

## 2024-07-09 PROCEDURE — 99215 OFFICE O/P EST HI 40 MIN: CPT

## 2024-07-11 ENCOUNTER — APPOINTMENT (OUTPATIENT)
Dept: GASTROENTEROLOGY | Facility: HOSPITAL | Age: 74
End: 2024-07-11

## 2024-07-12 ENCOUNTER — TRANSCRIPTION ENCOUNTER (OUTPATIENT)
Age: 74
End: 2024-07-12

## 2024-07-16 ENCOUNTER — TRANSCRIPTION ENCOUNTER (OUTPATIENT)
Age: 74
End: 2024-07-16

## 2024-08-23 ENCOUNTER — APPOINTMENT (OUTPATIENT)
Dept: PHARMACY | Facility: CLINIC | Age: 74
End: 2024-08-23
Payer: SELF-PAY

## 2024-08-23 PROCEDURE — V5299J: CUSTOM

## 2024-10-29 ENCOUNTER — APPOINTMENT (OUTPATIENT)
Dept: PULMONOLOGY | Facility: CLINIC | Age: 74
End: 2024-10-29
Payer: MEDICARE

## 2024-10-29 VITALS — HEART RATE: 71 BPM | SYSTOLIC BLOOD PRESSURE: 134 MMHG | OXYGEN SATURATION: 97 % | DIASTOLIC BLOOD PRESSURE: 77 MMHG

## 2024-10-29 DIAGNOSIS — G47.33 OBSTRUCTIVE SLEEP APNEA (ADULT) (PEDIATRIC): ICD-10-CM

## 2024-10-29 PROCEDURE — G2211 COMPLEX E/M VISIT ADD ON: CPT

## 2024-10-29 PROCEDURE — 99213 OFFICE O/P EST LOW 20 MIN: CPT

## 2024-12-10 ENCOUNTER — APPOINTMENT (OUTPATIENT)
Dept: NEUROLOGY | Facility: CLINIC | Age: 74
End: 2024-12-10
Payer: MEDICARE

## 2024-12-10 VITALS
HEART RATE: 69 BPM | WEIGHT: 150 LBS | HEIGHT: 65 IN | BODY MASS INDEX: 24.99 KG/M2 | SYSTOLIC BLOOD PRESSURE: 125 MMHG | DIASTOLIC BLOOD PRESSURE: 76 MMHG

## 2024-12-10 DIAGNOSIS — R26.9 UNSPECIFIED ABNORMALITIES OF GAIT AND MOBILITY: ICD-10-CM

## 2024-12-10 DIAGNOSIS — G91.9 HYDROCEPHALUS, UNSPECIFIED: ICD-10-CM

## 2024-12-10 DIAGNOSIS — G25.0 ESSENTIAL TREMOR: ICD-10-CM

## 2024-12-10 DIAGNOSIS — G91.2 (IDIOPATHIC) NORMAL PRESSURE HYDROCEPHALUS: ICD-10-CM

## 2024-12-10 PROCEDURE — 99214 OFFICE O/P EST MOD 30 MIN: CPT

## 2024-12-10 PROCEDURE — G2211 COMPLEX E/M VISIT ADD ON: CPT

## 2025-01-06 ENCOUNTER — APPOINTMENT (OUTPATIENT)
Dept: MRI IMAGING | Facility: IMAGING CENTER | Age: 75
End: 2025-01-06

## 2025-01-22 ENCOUNTER — APPOINTMENT (OUTPATIENT)
Dept: NUCLEAR MEDICINE | Facility: IMAGING CENTER | Age: 75
End: 2025-01-22
Payer: MEDICARE

## 2025-01-22 ENCOUNTER — OUTPATIENT (OUTPATIENT)
Dept: OUTPATIENT SERVICES | Facility: HOSPITAL | Age: 75
LOS: 1 days | End: 2025-01-22
Payer: MEDICARE

## 2025-01-22 ENCOUNTER — RESULT REVIEW (OUTPATIENT)
Age: 75
End: 2025-01-22

## 2025-01-22 DIAGNOSIS — R25.1 TREMOR, UNSPECIFIED: ICD-10-CM

## 2025-01-22 DIAGNOSIS — I83.90 ASYMPTOMATIC VARICOSE VEINS OF UNSPECIFIED LOWER EXTREMITY: Chronic | ICD-10-CM

## 2025-01-22 DIAGNOSIS — Z00.8 ENCOUNTER FOR OTHER GENERAL EXAMINATION: ICD-10-CM

## 2025-01-22 DIAGNOSIS — Z98.890 OTHER SPECIFIED POSTPROCEDURAL STATES: Chronic | ICD-10-CM

## 2025-01-22 PROCEDURE — 78803 RP LOCLZJ TUM SPECT 1 AREA: CPT | Mod: 26

## 2025-01-22 PROCEDURE — A9584: CPT

## 2025-01-22 PROCEDURE — 78803 RP LOCLZJ TUM SPECT 1 AREA: CPT

## 2025-03-03 ENCOUNTER — APPOINTMENT (OUTPATIENT)
Dept: MRI IMAGING | Facility: IMAGING CENTER | Age: 75
End: 2025-03-03
Payer: MEDICARE

## 2025-03-03 ENCOUNTER — OUTPATIENT (OUTPATIENT)
Dept: OUTPATIENT SERVICES | Facility: HOSPITAL | Age: 75
LOS: 1 days | End: 2025-03-03
Payer: MEDICARE

## 2025-03-03 DIAGNOSIS — G91.2 (IDIOPATHIC) NORMAL PRESSURE HYDROCEPHALUS: ICD-10-CM

## 2025-03-03 DIAGNOSIS — Z98.890 OTHER SPECIFIED POSTPROCEDURAL STATES: Chronic | ICD-10-CM

## 2025-03-03 DIAGNOSIS — R26.9 UNSPECIFIED ABNORMALITIES OF GAIT AND MOBILITY: ICD-10-CM

## 2025-03-03 DIAGNOSIS — I83.90 ASYMPTOMATIC VARICOSE VEINS OF UNSPECIFIED LOWER EXTREMITY: Chronic | ICD-10-CM

## 2025-03-03 PROCEDURE — 70551 MRI BRAIN STEM W/O DYE: CPT

## 2025-03-03 PROCEDURE — 70551 MRI BRAIN STEM W/O DYE: CPT | Mod: 26

## 2025-03-04 ENCOUNTER — NON-APPOINTMENT (OUTPATIENT)
Age: 75
End: 2025-03-04

## 2025-03-04 ENCOUNTER — APPOINTMENT (OUTPATIENT)
Dept: NEUROSURGERY | Facility: CLINIC | Age: 75
End: 2025-03-04
Payer: MEDICARE

## 2025-03-04 VITALS
SYSTOLIC BLOOD PRESSURE: 125 MMHG | OXYGEN SATURATION: 98 % | WEIGHT: 150 LBS | HEIGHT: 65 IN | HEART RATE: 62 BPM | DIASTOLIC BLOOD PRESSURE: 80 MMHG | BODY MASS INDEX: 24.99 KG/M2

## 2025-03-04 DIAGNOSIS — G91.2 (IDIOPATHIC) NORMAL PRESSURE HYDROCEPHALUS: ICD-10-CM

## 2025-03-04 PROCEDURE — 62252 CSF SHUNT REPROGRAM: CPT

## 2025-03-04 PROCEDURE — 99214 OFFICE O/P EST MOD 30 MIN: CPT

## 2025-06-11 ENCOUNTER — APPOINTMENT (OUTPATIENT)
Dept: NEUROLOGY | Facility: CLINIC | Age: 75
End: 2025-06-11
Payer: MEDICARE

## 2025-06-11 VITALS
SYSTOLIC BLOOD PRESSURE: 114 MMHG | BODY MASS INDEX: 25.61 KG/M2 | DIASTOLIC BLOOD PRESSURE: 74 MMHG | WEIGHT: 150 LBS | HEART RATE: 75 BPM | HEIGHT: 64 IN

## 2025-06-11 PROCEDURE — G2211 COMPLEX E/M VISIT ADD ON: CPT

## 2025-06-11 PROCEDURE — 99214 OFFICE O/P EST MOD 30 MIN: CPT

## 2025-08-11 ENCOUNTER — APPOINTMENT (OUTPATIENT)
Dept: NEUROLOGY | Facility: CLINIC | Age: 75
End: 2025-08-11
Payer: MEDICARE

## 2025-08-11 VITALS
BODY MASS INDEX: 25.61 KG/M2 | SYSTOLIC BLOOD PRESSURE: 129 MMHG | DIASTOLIC BLOOD PRESSURE: 78 MMHG | WEIGHT: 150 LBS | HEIGHT: 64 IN | HEART RATE: 62 BPM

## 2025-08-11 DIAGNOSIS — I63.449 CEREBRAL INFARCTION DUE TO EMBOLISM OF UNSPECIFIED CEREBELLAR ARTERY: ICD-10-CM

## 2025-08-11 PROCEDURE — 99205 OFFICE O/P NEW HI 60 MIN: CPT

## 2025-08-11 PROCEDURE — G2211 COMPLEX E/M VISIT ADD ON: CPT
